# Patient Record
Sex: MALE | Race: OTHER | HISPANIC OR LATINO | Employment: UNEMPLOYED | URBAN - METROPOLITAN AREA
[De-identification: names, ages, dates, MRNs, and addresses within clinical notes are randomized per-mention and may not be internally consistent; named-entity substitution may affect disease eponyms.]

---

## 2022-02-10 ENCOUNTER — HOSPITAL ENCOUNTER (EMERGENCY)
Facility: HOSPITAL | Age: 27
Discharge: HOME | End: 2022-02-10
Attending: EMERGENCY MEDICINE | Admitting: EMERGENCY MEDICINE
Payer: COMMERCIAL

## 2022-02-10 VITALS
BODY MASS INDEX: 21.05 KG/M2 | TEMPERATURE: 98.3 F | OXYGEN SATURATION: 98 % | WEIGHT: 155.2 LBS | DIASTOLIC BLOOD PRESSURE: 77 MMHG | RESPIRATION RATE: 18 BRPM | HEART RATE: 78 BPM | SYSTOLIC BLOOD PRESSURE: 130 MMHG

## 2022-02-10 DIAGNOSIS — K64.5 THROMBOSED EXTERNAL HEMORRHOID: Primary | ICD-10-CM

## 2022-02-10 PROCEDURE — 99282 EMERGENCY DEPT VISIT SF MDM: CPT

## 2022-02-10 PROCEDURE — 99282 EMERGENCY DEPT VISIT SF MDM: CPT | Performed by: EMERGENCY MEDICINE

## 2022-02-10 PROCEDURE — 46083 INC THROMBOSED HROID XTRNL: CPT | Performed by: EMERGENCY MEDICINE

## 2022-02-10 RX ORDER — LIDOCAINE HYDROCHLORIDE AND EPINEPHRINE 10; 10 MG/ML; UG/ML
20 INJECTION, SOLUTION INFILTRATION; PERINEURAL ONCE
Status: COMPLETED | OUTPATIENT
Start: 2022-02-10 | End: 2022-02-10

## 2022-02-10 RX ORDER — LIDOCAINE 40 MG/G
CREAM TOPICAL ONCE
Status: COMPLETED | OUTPATIENT
Start: 2022-02-10 | End: 2022-02-10

## 2022-02-10 RX ADMIN — LIDOCAINE 4% 1 APPLICATION: 4 CREAM TOPICAL at 17:53

## 2022-02-10 RX ADMIN — LIDOCAINE HYDROCHLORIDE,EPINEPHRINE BITARTRATE 20 ML: 10; .01 INJECTION, SOLUTION INFILTRATION; PERINEURAL at 17:53

## 2022-02-10 NOTE — DISCHARGE INSTRUCTIONS
Hemorrhoids   WHAT YOU NEED TO KNOW:   Hemorrhoids are swollen blood vessels inside your rectum (internal hemorrhoids) or on your anus (external hemorrhoids)  Sometimes a hemorrhoid may prolapse  This means it extends out of your anus  DISCHARGE INSTRUCTIONS:   Return to the emergency department if:   You have severe pain in your rectum or around your anus  You have severe pain in your abdomen and you are vomiting  You have bleeding from your anus that soaks through your underwear  Contact your healthcare provider if:   You have frequent and painful bowel movements  Your hemorrhoid looks or feels more swollen than usual      You do not have a bowel movement for 2 days or more  You see or feel tissue coming through your anus  You have questions or concerns about your condition or care  Take a sitz bath  Fill a bathtub with 4 to 6 inches of warm water  You may also use a sitz bath pan that fits inside a toilet bowl  Sit in the sitz bath for 15 minutes  Do this 3 times a day, and after each bowel movement  The warm water can help decrease pain and swelling  Keep your anal area clean  Gently wash the area with warm water daily  Soap may irritate the area  After a bowel movement, wipe with moist towelettes or wet toilet paper  Dry toilet paper can irritate the area  Prevent hemorrhoids:   Do not strain to have a bowel movement  Do not sit on the toilet too long  These actions can increase pressure on the tissues in your rectum and anus  Drink plenty of liquids  Liquids can help prevent constipation  Start a daily fiber supplement

## 2022-04-23 ENCOUNTER — HOSPITAL ENCOUNTER (EMERGENCY)
Facility: HOSPITAL | Age: 27
End: 2022-04-24
Attending: EMERGENCY MEDICINE
Payer: COMMERCIAL

## 2022-04-23 DIAGNOSIS — T14.91XA SUICIDE ATTEMPT (HCC): Primary | ICD-10-CM

## 2022-04-23 DIAGNOSIS — E87.6 HYPOKALEMIA: ICD-10-CM

## 2022-04-23 LAB
ALBUMIN SERPL BCP-MCNC: 4.7 G/DL (ref 3.5–5)
ALP SERPL-CCNC: 87 U/L (ref 46–116)
ALT SERPL W P-5'-P-CCNC: 33 U/L (ref 12–78)
AMPHETAMINES SERPL QL SCN: NEGATIVE
ANION GAP SERPL CALCULATED.3IONS-SCNC: 11 MMOL/L (ref 4–13)
APAP SERPL-MCNC: <2 UG/ML (ref 10–20)
APTT PPP: 25 SECONDS (ref 23–37)
AST SERPL W P-5'-P-CCNC: 25 U/L (ref 5–45)
ATRIAL RATE: 108 BPM
BARBITURATES UR QL: NEGATIVE
BASOPHILS # BLD AUTO: 0.04 THOUSANDS/ΜL (ref 0–0.1)
BASOPHILS NFR BLD AUTO: 0 % (ref 0–1)
BENZODIAZ UR QL: NEGATIVE
BILIRUB SERPL-MCNC: 0.49 MG/DL (ref 0.2–1)
BUN SERPL-MCNC: 8 MG/DL (ref 5–25)
CALCIUM SERPL-MCNC: 9.4 MG/DL (ref 8.3–10.1)
CHLORIDE SERPL-SCNC: 106 MMOL/L (ref 100–108)
CK MB SERPL-MCNC: 1.2 NG/ML (ref 0–5)
CK MB SERPL-MCNC: <1 % (ref 0–2.5)
CK SERPL-CCNC: 584 U/L (ref 39–308)
CO2 SERPL-SCNC: 27 MMOL/L (ref 21–32)
COCAINE UR QL: NEGATIVE
CREAT SERPL-MCNC: 1.08 MG/DL (ref 0.6–1.3)
EOSINOPHIL # BLD AUTO: 0.03 THOUSAND/ΜL (ref 0–0.61)
EOSINOPHIL NFR BLD AUTO: 0 % (ref 0–6)
ERYTHROCYTE [DISTWIDTH] IN BLOOD BY AUTOMATED COUNT: 12.7 % (ref 11.6–15.1)
ETHANOL SERPL-MCNC: 41 MG/DL (ref 0–3)
FLUAV RNA RESP QL NAA+PROBE: NEGATIVE
FLUBV RNA RESP QL NAA+PROBE: NEGATIVE
GFR SERPL CREATININE-BSD FRML MDRD: 94 ML/MIN/1.73SQ M
GLUCOSE SERPL-MCNC: 125 MG/DL (ref 65–140)
HCT VFR BLD AUTO: 48.1 % (ref 36.5–49.3)
HGB BLD-MCNC: 16.8 G/DL (ref 12–17)
IMM GRANULOCYTES # BLD AUTO: 0.04 THOUSAND/UL (ref 0–0.2)
IMM GRANULOCYTES NFR BLD AUTO: 0 % (ref 0–2)
INR PPP: 1.04 (ref 0.84–1.19)
LYMPHOCYTES # BLD AUTO: 2.14 THOUSANDS/ΜL (ref 0.6–4.47)
LYMPHOCYTES NFR BLD AUTO: 22 % (ref 14–44)
MCH RBC QN AUTO: 31.5 PG (ref 26.8–34.3)
MCHC RBC AUTO-ENTMCNC: 34.9 G/DL (ref 31.4–37.4)
MCV RBC AUTO: 90 FL (ref 82–98)
METHADONE UR QL: NEGATIVE
MONOCYTES # BLD AUTO: 0.61 THOUSAND/ΜL (ref 0.17–1.22)
MONOCYTES NFR BLD AUTO: 6 % (ref 4–12)
NEUTROPHILS # BLD AUTO: 6.85 THOUSANDS/ΜL (ref 1.85–7.62)
NEUTS SEG NFR BLD AUTO: 72 % (ref 43–75)
NRBC BLD AUTO-RTO: 0 /100 WBCS
OPIATES UR QL SCN: NEGATIVE
OXYCODONE+OXYMORPHONE UR QL SCN: NEGATIVE
P AXIS: 87 DEGREES
PCP UR QL: NEGATIVE
PLATELET # BLD AUTO: 314 THOUSANDS/UL (ref 149–390)
PMV BLD AUTO: 9.7 FL (ref 8.9–12.7)
POTASSIUM SERPL-SCNC: 3.3 MMOL/L (ref 3.5–5.3)
PR INTERVAL: 130 MS
PROT SERPL-MCNC: 7.8 G/DL (ref 6.4–8.2)
PROTHROMBIN TIME: 13.3 SECONDS (ref 11.6–14.5)
QRS AXIS: 71 DEGREES
QRSD INTERVAL: 84 MS
QT INTERVAL: 310 MS
QTC INTERVAL: 415 MS
RBC # BLD AUTO: 5.34 MILLION/UL (ref 3.88–5.62)
RSV RNA RESP QL NAA+PROBE: NEGATIVE
SALICYLATES SERPL-MCNC: <3 MG/DL (ref 3–20)
SARS-COV-2 RNA RESP QL NAA+PROBE: NEGATIVE
SODIUM SERPL-SCNC: 144 MMOL/L (ref 136–145)
T WAVE AXIS: 58 DEGREES
THC UR QL: POSITIVE
VENTRICULAR RATE: 108 BPM
WBC # BLD AUTO: 9.71 THOUSAND/UL (ref 4.31–10.16)

## 2022-04-23 PROCEDURE — 85610 PROTHROMBIN TIME: CPT | Performed by: EMERGENCY MEDICINE

## 2022-04-23 PROCEDURE — 80053 COMPREHEN METABOLIC PANEL: CPT | Performed by: EMERGENCY MEDICINE

## 2022-04-23 PROCEDURE — 36415 COLL VENOUS BLD VENIPUNCTURE: CPT | Performed by: EMERGENCY MEDICINE

## 2022-04-23 PROCEDURE — 80179 DRUG ASSAY SALICYLATE: CPT | Performed by: EMERGENCY MEDICINE

## 2022-04-23 PROCEDURE — 0241U HB NFCT DS VIR RESP RNA 4 TRGT: CPT | Performed by: EMERGENCY MEDICINE

## 2022-04-23 PROCEDURE — 82550 ASSAY OF CK (CPK): CPT | Performed by: EMERGENCY MEDICINE

## 2022-04-23 PROCEDURE — 80307 DRUG TEST PRSMV CHEM ANLYZR: CPT | Performed by: EMERGENCY MEDICINE

## 2022-04-23 PROCEDURE — 93010 ELECTROCARDIOGRAM REPORT: CPT | Performed by: INTERNAL MEDICINE

## 2022-04-23 PROCEDURE — 99285 EMERGENCY DEPT VISIT HI MDM: CPT | Performed by: EMERGENCY MEDICINE

## 2022-04-23 PROCEDURE — 80143 DRUG ASSAY ACETAMINOPHEN: CPT | Performed by: EMERGENCY MEDICINE

## 2022-04-23 PROCEDURE — 85025 COMPLETE CBC W/AUTO DIFF WBC: CPT | Performed by: EMERGENCY MEDICINE

## 2022-04-23 PROCEDURE — 82553 CREATINE MB FRACTION: CPT | Performed by: EMERGENCY MEDICINE

## 2022-04-23 PROCEDURE — 99285 EMERGENCY DEPT VISIT HI MDM: CPT

## 2022-04-23 PROCEDURE — 85730 THROMBOPLASTIN TIME PARTIAL: CPT | Performed by: EMERGENCY MEDICINE

## 2022-04-23 PROCEDURE — 93005 ELECTROCARDIOGRAM TRACING: CPT

## 2022-04-23 PROCEDURE — 82077 ASSAY SPEC XCP UR&BREATH IA: CPT | Performed by: EMERGENCY MEDICINE

## 2022-04-23 RX ORDER — HALOPERIDOL 5 MG/ML
5 INJECTION INTRAMUSCULAR
Status: CANCELLED | OUTPATIENT
Start: 2022-04-23

## 2022-04-23 RX ORDER — LORAZEPAM 2 MG/ML
1 INJECTION INTRAMUSCULAR EVERY 4 HOURS PRN
Status: CANCELLED | OUTPATIENT
Start: 2022-04-23

## 2022-04-23 RX ORDER — BENZTROPINE MESYLATE 1 MG/ML
0.5 INJECTION INTRAMUSCULAR; INTRAVENOUS
Status: CANCELLED | OUTPATIENT
Start: 2022-04-23

## 2022-04-23 RX ORDER — HYDROXYZINE HYDROCHLORIDE 25 MG/1
50 TABLET, FILM COATED ORAL
Status: CANCELLED | OUTPATIENT
Start: 2022-04-23

## 2022-04-23 RX ORDER — LORAZEPAM 2 MG/ML
2 INJECTION INTRAMUSCULAR
Status: CANCELLED | OUTPATIENT
Start: 2022-04-23

## 2022-04-23 RX ORDER — POTASSIUM CHLORIDE 20 MEQ/1
40 TABLET, EXTENDED RELEASE ORAL ONCE
Status: COMPLETED | OUTPATIENT
Start: 2022-04-23 | End: 2022-04-23

## 2022-04-23 RX ORDER — BENZTROPINE MESYLATE 1 MG/ML
1 INJECTION INTRAMUSCULAR; INTRAVENOUS
Status: CANCELLED | OUTPATIENT
Start: 2022-04-23

## 2022-04-23 RX ORDER — LORAZEPAM 2 MG/ML
1 INJECTION INTRAMUSCULAR
Status: CANCELLED | OUTPATIENT
Start: 2022-04-23

## 2022-04-23 RX ORDER — LORAZEPAM 1 MG/1
1 TABLET ORAL EVERY 6 HOURS PRN
Status: CANCELLED | OUTPATIENT
Start: 2022-04-23

## 2022-04-23 RX ORDER — HALOPERIDOL 5 MG
2.5 TABLET ORAL
Status: CANCELLED | OUTPATIENT
Start: 2022-04-23

## 2022-04-23 RX ORDER — ACETAMINOPHEN 325 MG/1
975 TABLET ORAL EVERY 6 HOURS PRN
Status: CANCELLED | OUTPATIENT
Start: 2022-04-23

## 2022-04-23 RX ORDER — HALOPERIDOL 1 MG/1
1 TABLET ORAL EVERY 6 HOURS PRN
Status: CANCELLED | OUTPATIENT
Start: 2022-04-23

## 2022-04-23 RX ORDER — MAGNESIUM HYDROXIDE/ALUMINUM HYDROXICE/SIMETHICONE 120; 1200; 1200 MG/30ML; MG/30ML; MG/30ML
30 SUSPENSION ORAL EVERY 4 HOURS PRN
Status: CANCELLED | OUTPATIENT
Start: 2022-04-23

## 2022-04-23 RX ORDER — HALOPERIDOL 5 MG
5 TABLET ORAL
Status: CANCELLED | OUTPATIENT
Start: 2022-04-23

## 2022-04-23 RX ORDER — ACETAMINOPHEN 325 MG/1
650 TABLET ORAL EVERY 6 HOURS PRN
Status: CANCELLED | OUTPATIENT
Start: 2022-04-23

## 2022-04-23 RX ORDER — HYDROXYZINE HYDROCHLORIDE 25 MG/1
25 TABLET, FILM COATED ORAL
Status: CANCELLED | OUTPATIENT
Start: 2022-04-23

## 2022-04-23 RX ORDER — BENZTROPINE MESYLATE 1 MG/1
1 TABLET ORAL EVERY 6 HOURS PRN
Status: CANCELLED | OUTPATIENT
Start: 2022-04-23

## 2022-04-23 RX ORDER — TRAZODONE HYDROCHLORIDE 50 MG/1
50 TABLET ORAL
Status: CANCELLED | OUTPATIENT
Start: 2022-04-23

## 2022-04-23 RX ORDER — POLYETHYLENE GLYCOL 3350 17 G/17G
17 POWDER, FOR SOLUTION ORAL DAILY PRN
Status: CANCELLED | OUTPATIENT
Start: 2022-04-23

## 2022-04-23 RX ORDER — ACETAMINOPHEN 325 MG/1
650 TABLET ORAL EVERY 4 HOURS PRN
Status: CANCELLED | OUTPATIENT
Start: 2022-04-23

## 2022-04-23 RX ORDER — HALOPERIDOL 5 MG/ML
2.5 INJECTION INTRAMUSCULAR
Status: CANCELLED | OUTPATIENT
Start: 2022-04-23

## 2022-04-23 RX ADMIN — POTASSIUM CHLORIDE 40 MEQ: 1500 TABLET, EXTENDED RELEASE ORAL at 04:46

## 2022-04-23 RX ADMIN — SODIUM CHLORIDE 1000 ML: 0.9 INJECTION, SOLUTION INTRAVENOUS at 03:13

## 2022-04-23 NOTE — ED NOTES
Crisis Worker searched [address] [place] [address] and was unable to locate any insurance under Patient name and

## 2022-04-23 NOTE — ED NOTES
Pt aware of need for urine sample but unable to provide one at this time       Liza Pearson  04/23/22 6878

## 2022-04-23 NOTE — ED NOTES
offered to pt to place a dinner order, pt declined, stated his visitor is going to  food         Phill Guthrie  04/23/22 6957

## 2022-04-23 NOTE — ED NOTES
Pt resting on stretcher with 1:1 observer at bedside  Pt calm and cooperative  Pt aware of p/u time of 0830 tomorrow        Yane Elizondo RN  04/23/22 6150

## 2022-04-23 NOTE — ED NOTES
Patient signed a voluntary admission form  He reports he has no experience with the mental health system and had no knowledge of inpatient treatment or ED behavioral health rules and restrictions  He was initially hesitant to consider inpatient treatment, but did decide he needs help and maybe inpatient treatment would be helpful for him  It was explained that he would need to wait in the ED until an inpatient bed was secured for him  He is uncertain if location of the facility matters to him more than wait time  He was medically cleared at shift change, so formal written assessment needs completion  201 explained and then signed by patient and physician, however

## 2022-04-23 NOTE — ED NOTES
Haley Peterson Insurance Authorization for admission:   Phone call placed to [facility] [facility] [employer]  Phone number: [phone]     Spoke to bill     5  days approved   Good from 4/23/2022 until 4/27/2022  Level of care:inpatient   Review on 4/27/2022  Authorization # [id number]      Please call back Uri Rothman  At [phone] ext [phone]

## 2022-04-23 NOTE — ED NOTES
Pt offered breakfast at this time however pt declined  Provided with water       Debi Viera, RN  04/23/22 6632

## 2022-04-23 NOTE — ED PROVIDER NOTES
History  Chief Complaint   Patient presents with    Overdose - Intentional     pt took 9 pseudophed pills of unk dosage, drank some wine, and smoked some "weed" when asked if pt had SI, patient stated its all the time and is actively experiencing it  Pt does not deny or confirm this attempt is to end his life    Psychiatric Evaluation     This is an otherwise healthy 54-year-old male who presents with intentional overdose  Approximately 1 hour prior to arrival, the patient took approximately 9 pills of Sudafed  Patient states that he has felt depressed his entire life  Tonight, the patient just wanted to "end it all, I do not want to be in pain anymore"  States that he had 2 glasses of wine and smoked "a little bit" of marijuana  No suicide attempts in the past   Denies any homicidal thoughts  Denies any auditory/visual hallucinations  Patient will not discuss the specifics around his depression or specific reason why he attempted to commit suicide tonight  Currently, the patient has no complaints  5:44 AM  patient is medically cleared for psychiatric evaluation and treatment  None       No past medical history on file  Past Surgical History:   Procedure Laterality Date    NO PAST SURGERIES         No family history on file  I have reviewed and agree with the history as documented  E-Cigarette/Vaping     E-Cigarette/Vaping Substances     Social History     Tobacco Use    Smoking status: Never Smoker    Smokeless tobacco: Never Used   Substance Use Topics    Alcohol use: No    Drug use: No       Review of Systems   Constitutional: Negative for chills, fatigue and fever  HENT: Negative for rhinorrhea, sore throat and trouble swallowing  Eyes: Negative for photophobia and visual disturbance  Respiratory: Negative for cough, chest tightness and shortness of breath  Cardiovascular: Negative for chest pain, palpitations and leg swelling     Gastrointestinal: Negative for abdominal pain, blood in stool, diarrhea, nausea and vomiting  Endocrine: Negative for polyuria  Genitourinary: Negative for dysuria, flank pain and hematuria  Musculoskeletal: Negative for back pain and neck pain  Skin: Negative for color change and rash  Allergic/Immunologic: Negative for immunocompromised state  Neurological: Negative for dizziness, weakness, light-headedness, numbness and headaches  Psychiatric/Behavioral: Positive for suicidal ideas  Depression   All other systems reviewed and are negative  Physical Exam  Physical Exam  Constitutional:       General: He is not in acute distress  Appearance: Normal appearance  He is well-developed  HENT:      Mouth/Throat:      Pharynx: Uvula midline  Eyes:      General: Lids are normal       Conjunctiva/sclera: Conjunctivae normal       Pupils: Pupils are equal, round, and reactive to light  Neck:      Thyroid: No thyroid mass or thyromegaly  Trachea: Trachea normal    Cardiovascular:      Rate and Rhythm: Regular rhythm  Tachycardia present  Heart sounds: Normal heart sounds  No murmur heard  Pulmonary:      Effort: Pulmonary effort is normal       Breath sounds: Normal breath sounds  Abdominal:      General: Bowel sounds are normal       Palpations: Abdomen is soft  Tenderness: There is no abdominal tenderness  There is no guarding or rebound  Negative signs include Bergman's sign  Skin:     General: Skin is warm and dry  Neurological:      Mental Status: He is alert  Psychiatric:         Mood and Affect: Mood is depressed  Speech: Speech normal          Behavior: Behavior is withdrawn  Behavior is cooperative  Thought Content:  Thought content normal          Vital Signs  ED Triage Vitals [04/23/22 0221]   Temperature Pulse Respirations Blood Pressure SpO2   98 4 °F (36 9 °C) 103 18 141/81 99 %      Temp Source Heart Rate Source Patient Position - Orthostatic VS BP Location FiO2 (%)   Oral Monitor Lying Right arm --      Pain Score       --           Vitals:    04/23/22 0430 04/23/22 0445 04/23/22 0515 04/23/22 0530   BP: 134/78 138/84 142/85 143/76   Pulse: 104 104 104 (!) 108   Patient Position - Orthostatic VS: Lying Lying Lying Lying         Visual Acuity      ED Medications  Medications   sodium chloride 0 9 % bolus 1,000 mL (0 mL Intravenous Stopped 4/23/22 0338)   potassium chloride (K-DUR,KLOR-CON) CR tablet 40 mEq (40 mEq Oral Given 4/23/22 0446)       Diagnostic Studies  Results Reviewed     Procedure Component Value Units Date/Time    CKMB [[id number]]  (Normal) Collected: 04/23/22 0304    Lab Status: Final result Specimen: Blood from Arm, Right Updated: 04/23/22 0407     CK-MB Index <1 0 %      CK-MB 1 2 ng/mL     Salicylate level [[id number]]  (Abnormal) Collected: 04/23/22 0304    Lab Status: Final result Specimen: Blood from Arm, Right Updated: 15/78/01 6674     Salicylate Lvl <3 mg/dL     Acetaminophen level-If concentration is detectable, please discuss with medical  on call  [[id number]]  (Abnormal) Collected: 04/23/22 0304    Lab Status: Final result Specimen: Blood from Arm, Right Updated: 04/23/22 0406     Acetaminophen Level <2 ug/mL     Rapid drug screen, urine [[id number]]  (Abnormal) Collected: 04/23/22 0337    Lab Status: Final result Specimen: Urine, Clean Catch Updated: 04/23/22 0405     Amph/Meth UR Negative     Barbiturate Ur Negative     Benzodiazepine Urine Negative     Cocaine Urine Negative     Methadone Urine Negative     Opiate Urine Negative     PCP Ur Negative     THC Urine Positive     Oxycodone Urine Negative    Narrative:      Presumptive report  If requested, specimen will be sent to reference lab for confirmation  FOR MEDICAL PURPOSES ONLY  IF CONFIRMATION NEEDED PLEASE CONTACT THE LAB WITHIN 5 DAYS      Drug Screen Cutoff Levels:  AMPHETAMINE/METHAMPHETAMINES  1000 ng/mL  BARBITURATES     200 ng/mL  BENZODIAZEPINES     200 ng/mL  COCAINE      300 ng/mL  METHADONE      300 ng/mL  OPIATES      300 ng/mL  PHENCYCLIDINE     25 ng/mL  THC       50 ng/mL  OXYCODONE      100 ng/mL    COVID/FLU/RSV - 2 hour TAT [[id number]]  (Normal) Collected: 04/23/22 0305    Lab Status: Final result Specimen: Nares from Nose Updated: 04/23/22 0350     SARS-CoV-2 Negative     INFLUENZA A PCR Negative     INFLUENZA B PCR Negative     RSV PCR Negative    Narrative:      FOR PEDIATRIC PATIENTS - copy/paste COVID Guidelines URL to browser: [email or web address]  [employer]    SARS-CoV-2 assay is a Nucleic Acid Amplification assay intended for the  qualitative detection of nucleic acid from SARS-CoV-2 in nasopharyngeal  swabs  Results are for the presumptive identification of SARS-CoV-2 RNA  Positive results are indicative of infection with SARS-CoV-2, the virus  causing COVID-19, but do not rule out bacterial infection or co-infection  with other viruses  Laboratories within the [place] and its  territories are required to report all positive results to the appropriate  public health authorities  Negative results do not preclude SARS-CoV-2  infection and should not be used as the sole basis for treatment or other  patient management decisions  Negative results must be combined with  clinical observations, patient history, and epidemiological information  This test has not been FDA cleared or approved  This test has been authorized by FDA under an Emergency Use Authorization  (EUA)  This test is only authorized for the duration of time the  declaration that circumstances exist justifying the authorization of the  emergency use of an in vitro diagnostic tests for detection of SARS-CoV-2  virus and/or diagnosis of COVID-19 infection under section 564(b)(1) of  the Act, 21 U  S C  608KNO-3(A)(3), unless the authorization is terminated  or revoked sooner   The test has been validated but independent review by FDA  and CLIA is pending  Test performed using [employer] GeneXpert: This RT-PCR assay targets N2,  a region unique to SARS-CoV-2  A conserved region in the E-gene was chosen  for pan-Sarbecovirus detection which includes SARS-CoV-2      CK Total with Reflex CKMB [[id number]]  (Abnormal) Collected: 04/23/22 0304    Lab Status: Final result Specimen: Blood from Arm, Right Updated: 04/23/22 0348     Total  U/L     Comprehensive metabolic panel [[id number]]  (Abnormal) Collected: 04/23/22 0304    Lab Status: Final result Specimen: Blood from Arm, Right Updated: 04/23/22 0329     Sodium 144 mmol/L      Potassium 3 3 mmol/L      Chloride 106 mmol/L      CO2 27 mmol/L      ANION GAP 11 mmol/L      BUN 8 mg/dL      Creatinine 1 08 mg/dL      Glucose 125 mg/dL      Calcium 9 4 mg/dL      AST 25 U/L      ALT 33 U/L      Alkaline Phosphatase 87 U/L      Total Protein 7 8 g/dL      Albumin 4 7 g/dL      Total Bilirubin 0 49 mg/dL      eGFR 94 ml/min/1 73sq m     Narrative:      [place] guidelines for Chronic Kidney Disease (CKD):     Stage 1 with normal or high GFR (GFR > 90 mL/min/1 73 square meters)    Stage 2 Mild CKD (GFR = 60-89 mL/min/1 73 square meters)    Stage 3A Moderate CKD (GFR = 45-59 mL/min/1 73 square meters)    Stage 3B Moderate CKD (GFR = 30-44 mL/min/1 73 square meters)    Stage 4 Severe CKD (GFR = 15-29 mL/min/1 73 square meters)    Stage 5 End Stage CKD (GFR <15 mL/min/1 73 square meters)  Note: GFR calculation is accurate only with a steady state creatinine    Ethanol [[id number]]  (Abnormal) Collected: 04/23/22 0304    Lab Status: Final result Specimen: Blood from Arm, Right Updated: 04/23/22 0325     Ethanol Lvl 41 mg/dL     Protime-INR [[id number]]  (Normal) Collected: 04/23/22 0304    Lab Status: Final result Specimen: Blood from Arm, Right Updated: 04/23/22 0324     Protime 13 3 seconds      INR 1 04    APTT [[id number]]  (Normal) Collected: 04/23/22 0304    Lab Status: Final result Specimen: Blood from Arm, Right Updated: 04/23/22 0324     PTT 25 seconds     CBC and differential [[id number]] Collected: 04/23/22 0304    Lab Status: Final result Specimen: Blood from Arm, Right Updated: 04/23/22 0313     WBC 9 71 Thousand/uL      RBC 5 34 Million/uL      Hemoglobin 16 8 g/dL      Hematocrit 48 1 %      MCV 90 fL      MCH 31 5 pg      MCHC 34 9 g/dL      RDW 12 7 %      MPV 9 7 fL      Platelets 571 Thousands/uL      nRBC 0 /100 WBCs      Neutrophils Relative 72 %      Immat GRANS % 0 %      Lymphocytes Relative 22 %      Monocytes Relative 6 %      Eosinophils Relative 0 %      Basophils Relative 0 %      Neutrophils Absolute 6 85 Thousands/µL      Immature Grans Absolute 0 04 Thousand/uL      Lymphocytes Absolute 2 14 Thousands/µL      Monocytes Absolute 0 61 Thousand/µL      Eosinophils Absolute 0 03 Thousand/µL      Basophils Absolute 0 04 Thousands/µL                  No orders to display              Procedures  ECG 12 Lead Documentation Only    Date/Time: 4/23/2022 3:22 AM  Performed by: Yasmeen Linad MD  Authorized by: Yasmeen Linda MD     ECG reviewed by me, the ED Provider: yes    Patient location:  ED  Previous ECG:     Previous ECG:  Unavailable    Comparison to cardiac monitor: Yes    Interpretation:     Interpretation: non-specific    Rate:     ECG rate:  117    ECG rate assessment: tachycardic    Rhythm:     Rhythm: sinus tachycardia    Ectopy:     Ectopy: none    QRS:     QRS axis:  Normal    QRS intervals:  Normal  Conduction:     Conduction: normal    ST segments:     ST segments:  Normal  T waves:     T waves: normal               ED Course  ED Course as of 04/23/22 0544   Sat Apr 23, 2022   0349 Spoke with Candi Ruiz from poison control  Patient can exhibit hypertension, hypokalemia, tachycardia  Can also exhibit agitation and seizure which should be treated symptomatically with benzos  Can have n/v as well    Recommended 4 to 6 hour obs period post ingestion  0532 ALT: 33                               SBIRT 20yo+      Most Recent Value   SBIRT (25 yo +)    In order to provide better care to our patients, we are screening all of our patients for alcohol and drug use  Would it be okay to ask you these screening questions? Yes Filed at: 04/23/2022 2572   Initial Alcohol Screen: US AUDIT-C     1  How often do you have a drink containing alcohol? 0 Filed at: 04/23/2022 0229   2  How many drinks containing alcohol do you have on a typical day you are drinking? 1 Filed at: 04/23/2022 0229   3a  Male UNDER 65: How often do you have five or more drinks on one occasion? 0 Filed at: 04/23/2022 0229   3b  FEMALE Any Age, or MALE 65+: How often do you have 4 or more drinks on one occassion? 0 Filed at: 04/23/2022 7706   Audit-C Score 1 Filed at: 04/23/2022 9969   ARCADIO: How many times in the past year have you    Used an illegal drug or used a prescription medication for non-medical reasons? Once or Twice Filed at: 04/23/2022 0229   DAST-10: In the past 12 months       1  Have you used drugs other than those required for medical reasons? 1 Filed at: 04/23/2022 0229   2  Do you use more than one drug at a time? 0 Filed at: 04/23/2022 0229   3  Have you had medical problems as a result of your drug use (e g , memory loss, hepatitis, convulsions, bleeding, etc )? 0 Filed at: 04/23/2022 0229   4  Have you had "blackouts" or "flashbacks" as a result of drug use? YesNo 0 Filed at: 04/23/2022 0229   5  Do you ever feel bad or guilty about your drug use? 0 Filed at: 04/23/2022 0229   6  Does your spouse (or parent) ever complain about your involvement with drugs? 0 Filed at: 04/23/2022 0229   7  Have you neglected your family because of your use of drugs? 0 Filed at: 04/23/2022 0229   8  Have you engaged in illegal activities in order to obtain drugs? 0 Filed at: 04/23/2022 0229   9  Have you ever experienced withdrawal symptoms (felt sick) when you stopped taking drugs?  0 Filed at: 04/23/2022 0229   10  Are you always able to stop using drugs when you want to? 0 Filed at: 04/23/2022 0229   DAST-10 Score 1 Filed at: 04/23/2022 0229                    [facility]  Number of Diagnoses or Management Options  Diagnosis management comments: Will check labs, EKG  Rapid urine drug screen  Will speak with poison Control  Patient will require a 302 if he does not want to sign a 201 when medically cleared  Disposition  Final diagnoses:   Suicide attempt [facility])   Hypokalemia     Time reflects when diagnosis was documented in both MDM as applicable and the Disposition within this note     Time User Action Codes Description Comment    4/23/2022  4:43 AM Rober Jennings Tabelbert Sageananyaugh Suicide attempt ([address] )     4/23/2022  4:44 AM Regan Sarkar Add [E87 6] Hypokalemia       ED Disposition     ED Disposition Condition Date/Time Comment    Transfer to [facility] Apr 23, 2022  5:44 AM Edmund De La Paz should be transferred out to [facility] and has been medically cleared  Follow-up Information    None         Patient's Medications    No medications on file       No discharge procedures on file      PDMP Review     None          ED Provider  Electronically Signed by           Marialuisa Lee MD  04/23/22 0066

## 2022-04-23 NOTE — ED NOTES
Cw talked with Patient and he is reporting that someone will be home around noon so he can get his insurance card information then   He is reporting that it is a [employer] and the employer is [employer]

## 2022-04-23 NOTE — ED NOTES
LEO FELIX scheduled for 830AM to [facility]    Crisis spoke with Sherrie @ [facility] to communicate transport time

## 2022-04-23 NOTE — ED NOTES
Divya Muhammad  is 32 y o ,  male  He is currently in a long term relationship with  his girlfriend  He is employed full time for [employer]  He is a  by trade  Male who self-referred to ED due to Intentional overdose  When asked about the presenting problem, patient stated,"it is a combination of everything going on in his life "  Patient reported struggling with Depression and SI  He denied HI and Hallucinations  Patient was very vague when talking about the current stressors that had him take 9 pseudophed pills, drink  wine and smoke weed    Patient lives with his girlfriend and is able to return back to his home  Pt denies any access to guns  Pt is reporting that his appetite changes day to day  He is currently forcing himself to eat  Some days he is sick, some days he has no appetite  Pt is reporting that he has been having difficulty sleeping  He is averaging 3 to 4 hours a night  Pt denies any legal history  Pt is reporting that he has no previous or current mental health treatment  Patient was receptive to 201  201 form was completed on previous shift  Crisis worker went over the time constraints  on a 201 and Pt is reporting that 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

## 2022-04-23 NOTE — ED NOTES
RN assumed care of pt at this time  Pt is resting on stretcher with equal and even chest rise noted  No signed of distress at this time  ED Tech Eveline on pt 1:1        Yamilet Tovar RN  04/23/22 8692

## 2022-04-23 NOTE — ED NOTES
Assumed care of pt at this time, pt remains on 1:1 observation, a/w p/u time to [place]  Pt with normal work of breathing        Landon Cardenas RN  04/23/22 7618

## 2022-04-23 NOTE — EMTALA/ACUTE CARE TRANSFER
[facility] [address]  [address] [facility] [address]  Dept: [phone]      EMTALA TRANSFER CONSENT    NAME Alix Randolph                                         1995                              MRN [id number]    I have been informed of my rights regarding examination, treatment, and transfer   by Dr Austin Smith DO    Benefits: Specialized equipment and/or services available at the receiving facility (Include comment)________________________ [facility])    Risks: Potential for delay in receiving treatment,Potential deterioration of medical condition,Increased discomfort during transfer,Possible worsening of condition or death during transfer      Consent for Transfer:  I acknowledge that my medical condition has been evaluated and explained to me by the emergency department physician or other qualified medical person and/or my attending physician, who has recommended that I be transferred to the service of  Accepting Physician: Corina Steinberg at [address] Name, [address] : [place]  The above potential benefits of such transfer, the potential risks associated with such transfer, and the probable risks of not being transferred have been explained to me, and I fully understand them  The doctor has explained that, in my case, the benefits of transfer outweigh the risks  I agree to be transferred  I authorize the performance of emergency medical procedures and treatments upon me in both transit and upon arrival at the receiving facility  Additionally, I authorize the release of any and all medical records to the receiving facility and request they be transported with me, if possible  I understand that the safest mode of transportation during a medical emergency is an ambulance and that the Hospital advocates the use of this mode of transport   Risks of traveling to the receiving facility by car, including absence of medical control, life sustaining equipment, such as oxygen, and medical personnel has been explained to me and I fully understand them  (REAL CORRECT BOX BELOW)  [  ]  I consent to the stated transfer and to be transported by ambulance/helicopter  [  ]  I consent to the stated transfer, but refuse transportation by ambulance and accept full responsibility for my transportation by car  I understand the risks of non-ambulance transfers and I exonerate the Hospital and its staff from any deterioration in my condition that results from this refusal     X___________________________________________    DATE  22  TIME________  Signature of patient or legally responsible individual signing on patient behalf           RELATIONSHIP TO PATIENT_________________________          Provider Certification    NAME Mariam Nur                                         1995                              MRN [id number]    A medical screening exam was performed on the above named patient  Based on the examination:    Condition Necessitating Transfer The primary encounter diagnosis was Suicide attempt [facility])  A diagnosis of Hypokalemia was also pertinent to this visit      Patient Condition: The patient has been stabilized such that within reasonable medical probability, no material deterioration of the patient condition or the condition of the unborn child(mike) is likely to result from the transfer    Reason for Transfer: Level of Care needed not available at this facility    Transfer Requirements: [address]   · Space available and qualified personnel available for treatment as acknowledged by    · Agreed to accept transfer and to provide appropriate medical treatment as acknowledged by       [facility]  · Appropriate medical records of the examination and treatment of the patient are provided at the time of transfer   [address], [address] _______  · Transfer will be performed by qualified personnel from    and appropriate transfer equipment as required, including the use of necessary and appropriate life support measures  Provider Certification: I have examined the patient and explained the following risks and benefits of being transferred/refusing transfer to the patient/family:  General risk, such as traffic hazards, adverse weather conditions, rough terrain or turbulence, possible failure of equipment (including vehicle or aircraft), or consequences of actions of persons outside the control of the transport personnel,Unanticipated needs of medical equipment and personnel during transport,Risk of worsening condition      Based on these reasonable risks and benefits to the patient and/or the unborn child(mike), and based upon the information available at the time of the patients examination, I certify that the medical benefits reasonably to be expected from the provision of appropriate medical treatments at another medical facility outweigh the increasing risks, if any, to the individuals medical condition, and in the case of labor to the unborn child, from effecting the transfer      X____________________________________________ DATE 04/23/22        TIME_______      ORIGINAL - SEND TO MEDICAL RECORDS   COPY - SEND WITH PATIENT DURING TRANSFER

## 2022-04-23 NOTE — ED NOTES
Cw went to talk with patient he is reporting that he has medical insurance through his employer  He believes that it is a blue [employer] or a blue shield  CW asked him to call someone at home to get name on card and Pt reporting that no one is at home  Cw asked if he could text his employer no one is in the office on a Saturday  Pt is reporting that he has no friends at work to ask      Pt MA is inactive

## 2022-04-24 ENCOUNTER — HOSPITAL ENCOUNTER (INPATIENT)
Facility: HOSPITAL | Age: 27
LOS: 4 days | Discharge: HOME | DRG: 885 | End: 2022-04-28
Attending: HOSPITALIST | Admitting: PSYCHIATRY & NEUROLOGY
Payer: COMMERCIAL

## 2022-04-24 VITALS
TEMPERATURE: 98.1 F | DIASTOLIC BLOOD PRESSURE: 78 MMHG | OXYGEN SATURATION: 100 % | SYSTOLIC BLOOD PRESSURE: 124 MMHG | HEART RATE: 82 BPM | RESPIRATION RATE: 18 BRPM

## 2022-04-24 DIAGNOSIS — F32.2 MAJOR DEPRESSIVE DISORDER, SINGLE EPISODE, SEVERE WITHOUT PSYCHOSIS (HCC): Primary | ICD-10-CM

## 2022-04-24 DIAGNOSIS — E53.8 VITAMIN B12 DEFICIENCY: ICD-10-CM

## 2022-04-24 DIAGNOSIS — E87.6 HYPOKALEMIA: ICD-10-CM

## 2022-04-24 DIAGNOSIS — E55.9 VITAMIN D DEFICIENCY: ICD-10-CM

## 2022-04-24 LAB
ATRIAL RATE: 117 BPM
P AXIS: 83 DEGREES
PR INTERVAL: 130 MS
QRS AXIS: 68 DEGREES
QRSD INTERVAL: 82 MS
QT INTERVAL: 304 MS
QTC INTERVAL: 424 MS
T WAVE AXIS: 37 DEGREES
VENTRICULAR RATE: 117 BPM

## 2022-04-24 PROCEDURE — 93010 ELECTROCARDIOGRAM REPORT: CPT | Performed by: INTERNAL MEDICINE

## 2022-04-24 PROCEDURE — 99221 1ST HOSP IP/OBS SF/LOW 40: CPT | Performed by: PSYCHIATRY & NEUROLOGY

## 2022-04-24 RX ORDER — POLYETHYLENE GLYCOL 3350 17 G/17G
17 POWDER, FOR SOLUTION ORAL DAILY PRN
Status: DISCONTINUED | OUTPATIENT
Start: 2022-04-24 | End: 2022-04-28 | Stop reason: HOSPADM

## 2022-04-24 RX ORDER — TRAZODONE HYDROCHLORIDE 50 MG/1
50 TABLET ORAL
Status: DISCONTINUED | OUTPATIENT
Start: 2022-04-24 | End: 2022-04-28 | Stop reason: HOSPADM

## 2022-04-24 RX ORDER — BENZTROPINE MESYLATE 1 MG/ML
0.5 INJECTION INTRAMUSCULAR; INTRAVENOUS
Status: DISCONTINUED | OUTPATIENT
Start: 2022-04-24 | End: 2022-04-28 | Stop reason: HOSPADM

## 2022-04-24 RX ORDER — LORAZEPAM 2 MG/ML
1 INJECTION INTRAMUSCULAR EVERY 4 HOURS PRN
Status: DISCONTINUED | OUTPATIENT
Start: 2022-04-24 | End: 2022-04-28 | Stop reason: HOSPADM

## 2022-04-24 RX ORDER — HYDROXYZINE 50 MG/1
50 TABLET, FILM COATED ORAL
Status: DISCONTINUED | OUTPATIENT
Start: 2022-04-24 | End: 2022-04-28 | Stop reason: HOSPADM

## 2022-04-24 RX ORDER — LORAZEPAM 2 MG/ML
2 INJECTION INTRAMUSCULAR
Status: DISCONTINUED | OUTPATIENT
Start: 2022-04-24 | End: 2022-04-28 | Stop reason: HOSPADM

## 2022-04-24 RX ORDER — ACETAMINOPHEN 325 MG/1
650 TABLET ORAL EVERY 6 HOURS PRN
Status: DISCONTINUED | OUTPATIENT
Start: 2022-04-24 | End: 2022-04-28 | Stop reason: HOSPADM

## 2022-04-24 RX ORDER — HYDROXYZINE HYDROCHLORIDE 25 MG/1
25 TABLET, FILM COATED ORAL
Status: DISCONTINUED | OUTPATIENT
Start: 2022-04-24 | End: 2022-04-28 | Stop reason: HOSPADM

## 2022-04-24 RX ORDER — HALOPERIDOL 0.5 MG/1
1 TABLET ORAL EVERY 6 HOURS PRN
Status: DISCONTINUED | OUTPATIENT
Start: 2022-04-24 | End: 2022-04-28 | Stop reason: HOSPADM

## 2022-04-24 RX ORDER — BENZTROPINE MESYLATE 1 MG/ML
1 INJECTION INTRAMUSCULAR; INTRAVENOUS
Status: DISCONTINUED | OUTPATIENT
Start: 2022-04-24 | End: 2022-04-28 | Stop reason: HOSPADM

## 2022-04-24 RX ORDER — LORAZEPAM 2 MG/ML
1 INJECTION INTRAMUSCULAR
Status: DISCONTINUED | OUTPATIENT
Start: 2022-04-24 | End: 2022-04-28 | Stop reason: HOSPADM

## 2022-04-24 RX ORDER — MAGNESIUM HYDROXIDE/ALUMINUM HYDROXICE/SIMETHICONE 120; 1200; 1200 MG/30ML; MG/30ML; MG/30ML
30 SUSPENSION ORAL EVERY 4 HOURS PRN
Status: DISCONTINUED | OUTPATIENT
Start: 2022-04-24 | End: 2022-04-28 | Stop reason: HOSPADM

## 2022-04-24 RX ORDER — ESCITALOPRAM OXALATE 5 MG/1
5 TABLET ORAL DAILY
Status: DISCONTINUED | OUTPATIENT
Start: 2022-04-24 | End: 2022-04-27

## 2022-04-24 RX ORDER — HALOPERIDOL 5 MG/ML
2.5 INJECTION INTRAMUSCULAR
Status: DISCONTINUED | OUTPATIENT
Start: 2022-04-24 | End: 2022-04-28 | Stop reason: HOSPADM

## 2022-04-24 RX ORDER — ACETAMINOPHEN 325 MG/1
650 TABLET ORAL EVERY 4 HOURS PRN
Status: DISCONTINUED | OUTPATIENT
Start: 2022-04-24 | End: 2022-04-28 | Stop reason: HOSPADM

## 2022-04-24 RX ORDER — BENZTROPINE MESYLATE 1 MG/1
1 TABLET ORAL EVERY 6 HOURS PRN
Status: DISCONTINUED | OUTPATIENT
Start: 2022-04-24 | End: 2022-04-28 | Stop reason: HOSPADM

## 2022-04-24 RX ORDER — LORAZEPAM 1 MG/1
1 TABLET ORAL EVERY 6 HOURS PRN
Status: DISCONTINUED | OUTPATIENT
Start: 2022-04-24 | End: 2022-04-28 | Stop reason: HOSPADM

## 2022-04-24 RX ORDER — HALOPERIDOL 5 MG/ML
5 INJECTION INTRAMUSCULAR
Status: DISCONTINUED | OUTPATIENT
Start: 2022-04-24 | End: 2022-04-28 | Stop reason: HOSPADM

## 2022-04-24 RX ORDER — ACETAMINOPHEN 325 MG/1
975 TABLET ORAL EVERY 6 HOURS PRN
Status: DISCONTINUED | OUTPATIENT
Start: 2022-04-24 | End: 2022-04-28 | Stop reason: HOSPADM

## 2022-04-24 RX ORDER — HALOPERIDOL 5 MG
5 TABLET ORAL
Status: DISCONTINUED | OUTPATIENT
Start: 2022-04-24 | End: 2022-04-28 | Stop reason: HOSPADM

## 2022-04-24 RX ADMIN — ESCITALOPRAM 5 MG: 5 TABLET, FILM COATED ORAL at 12:50

## 2022-04-24 NOTE — NURSING NOTE
Pt arrived to [facility] ED  with 2 attendants via walk   Patient  under  201 Admitted for attempted suicide by overdose on seudophed pills  Minimize reason  for admission  "Realize he made a mistake after taking the pills then call 911"  Report feeling safe on unit  Agrees to let staff know if he feels unsafe or having thoughts for hurting himself  Cooperative during admission process  Compliant with skin check and body assessment   Patient orientated  to unit and room   Denies drug / alcohol use

## 2022-04-24 NOTE — H&P
Psychiatric Evaluation - Alfonso [age] y o  male MRN: [id number]  Unit/Bed#: [facility] 254-01 Encounter: [id number]    Assessment/Plan   Principal Problem:    Major depressive disorder, single episode, severe without psychosis ([address] )  Patient has bright and appropriate affect and engages in conversation with this interview in office  Does seem to be minimizing his symptoms  Does report having impulsive decision leading up to admission and feeling chronically sad but denying overt depression  Does screen positive for major depressive episode but does seem to have been fleeting and is not current as he is denying any safety concerning symptoms  Does not seem to be experiencing any hallucinogenic material and is not responding to internal stimuli  Does laugh and joke on occasion during interview and has good goal directed insight into his life in changes that need to be made  Reports the would like to engage in therapy in the future if this became available  Patient does not have psychiatric history in his past but does report multiple psychosocial stressors especially with his family and having and argument with his mother prior to overdose which he believes may have been a contributing trigger  Also reports unstable relationship with father who lives in [place] and often demands money from him  Does report poor support system and encourage patient to increase his social network and consider options such as joining clubs or other activities that he enjoys  States that he had instant regret after taking too many Sudafed and immediately self-referred himself to the emergency department  Does admit to fleeting passive death wishes at times and not wanting to experience the pain anymore    Indicates that this mostly revolves around wanting to have a normal family    Patient did try marijuana for the 1st time prior to admission approximately a few hours prior which may also have been a contributing factor as this could have acutely increased his anxiety level  Patient is unsure if he may have had products that could have been laced with alternative compounds that would not show up in UDS  Will start patient on small dose of Lexapro for mood and anxiety concerns  Will continue to monitor  Plan:   1) Start Lexapro 5 mg PO QD for mood and anxiety  2) case management to discuss psychotherapy options as outpatient  3) recommended increased peer socialization after discharge and joining some clubs or other activities he enjoys  Admission labs  Frequent safety checks and vitals per unit protocol  Collaborate with family for baseline assessment and disposition planning  Case discussed with treatment team   Treatment options and alternatives were reviewed with the patient       -----------------------------------    Chief Complaint:  I did not want to be in pain anymore      History of Present Illness     Zainab Cheung is a 32 y o  male with no prior diagnosed psychiatric history who presents voluntarily via a 12 for suicide attempt after taking 9 Sudafed pills of unknown dose in addition to wine and some marijuana  Notes emergency room indicate that patient had active and consistent suicidal ideations but patient denies this on interview with this provider today  Does state that he made impulsive decision because I just wanted the pain to end    States that this was the 1st time he ever did anything like this and denies any previous suicide attempts  Endorses large degree of remorse for his decision to do this and did admit to fleeting passive death wishes and acute psychosocial stressors that made him feel overwhelmed including argument with his mother who made demands that he should care for her more and father who has been demanding money from him that lives in [address] [place] [address] that he had instant regret immediately following the overdose and self-referred himself to the emergency room  Patient denies ever being psychiatrically hospitalized before  Denies any outpatient psychiatry or therapy services in the past   Denies any previous psychotropic trials  Patient is amendable to trial of medications and like to consider psychotherapy as outpatient  Patient does admit to some depression and sadness particularly regarding not having a normal family   Patient denies any hopelessness, helplessness, worthlessness, or guilt but does state that he feels disappointed about his actions that led to his admission  Indicates that he was overwhelmed because everything going on in my life    Reports decreased sleep of approximately 3-4 hours secondary to working night shift as a  but states that when he is not working his sleep is more normal at 6 to 7 hours  Denies any changes in interest level and states that he continues to enjoy a technology and programming  Does report decreased energy level lately  States that appetite does fluctuate up and down but denies any changes in his weight  Denies any troubles with concentration or memory  Denies any suicidal ideations or homicidal ideations but does admit to fleeting passive death wishes prior to overdose  Denies any history of dennys  Denies any auditory or visual hallucinations now or in the past   Denies any OCD symptoms or paranoia  Denies any history of abuse/trauma/PTSD symptoms  Denies any eating disorders  Patient lives in [address] [place] [address] with his girlfriend of 7 months and occasionally her 3year-old daughter whom she has joint custody of  Reports that he works for [employer] for the past 3-4 months and has been a  for 4 years  High school highest level of education  Denies legal history  Denies  history or access to firearms  Family psychiatric history is unknown and does state his father abused alcohol    Patient states he drinks alcohol socially for the past year but had not had alcohol prior to this   States that he had 2 glasses prior to admission  Denies ever having blackouts, seizure, withdrawal symptoms, need for detox/rehab, or DUIs  Reports that he used marijuana for the very 1st time prior to admission when his friend came over and is unsure if he had laced compounds in this  Denies any other illicit drug use  Denies any tobacco use or vaping  Does report drinking 1 cup of coffee in the morning  Medical Review Of Systems:  Complete review of systems is negative except as noted above  Psychiatric Review Of Systems:  Problems with sleep: yes, decreased  Appetite changes: yes, decreased  Weight changes: no  Low energy/anergy: yes  Low interest/pleasure/anhedonia: no  Somatic symptoms: no  Anxiety/panic: no  Shefali: no  Guilt/hopeless: no  Self injurious behavior/risky behavior: yes, intentional overdose    Historical Information     Psychiatric History:   Prior psychiatric diagnoses: patient denies  Inpatient hospitalizations: patient denies  Suicide attempts: This is patient's 1st attempt  Self-harm behaviors: patient denies  Violent behavior: patient denies  Outpatient treatment: patient denies  Psychiatric medication trial: None    Substance Abuse History:  Social History     Tobacco Use    Smoking status: Never Smoker    Smokeless tobacco: Never Used   Vaping Use    Vaping Use: Never used   Substance Use Topics    Alcohol use: No    Drug use: No       Patient states he drinks alcohol socially for the past year but had not had alcohol prior to this  States that he had 2 glasses prior to admission  Denies ever having blackouts, seizure, withdrawal symptoms, need for detox/rehab, or DUIs  Reports that he used marijuana for the very 1st time prior to admission when his friend came over and is unsure if he had laced compounds in this  Denies any other illicit drug use  Denies any tobacco use or vaping  Does report drinking 1 cup of coffee in the morning     I have assessed this patient for substance use within the past 12 months  Family Psychiatric History:   Family psychiatric history is unknown and does state his father abused alcohol  Social History  Marital history: Single  Children: no  Living arrangement: Lives in a home in [place], [place] with with girlfriend and sometimes girlfriend's 3year-old kid whom she has partial custody of    Functioning Relationships: good relationship with spouse or significant other, alone & isolated, poor relationship with parents and poor support system  Education: high school diploma/GED  Occupational History: works as a  for past 4 years  Other Pertinent History: None      Traumatic History:   Abuse: denies  Other Traumatic Events: denies    Past Medical History:   Past Medical History:   Diagnosis Date    Major depressive disorder         -----------------------------------  Objective    Temp:  [97 6 °F (36 4 °C)-98 1 °F (36 7 °C)] 97 6 °F (36 4 °C)  HR:  [73-98] 73  Resp:  [16-18] 18  BP: (110-135)/(56-88) 115/71    Mental Status Exam:  Appearance:  alert, good eye contact, appears stated age, casually dressed and appropriate grooming and hygiene   Behavior:  calm, cooperative and sitting comfortably   Motor: no abnormal movements and normal gait and balance   Speech:  spontaneous, normal rate, normal volume and coherent   Mood:  "I do not want to be in pain anymore"   Affect:  mood-congruent, constricted, depressed and anxious   Thought Process:  Organized, logical, goal-directed   Thought Content: no verbalized delusions or overt paranoia   Perceptual disturbances: no reported hallucinations and does not appear to be responding to internal stimuli at this time   Risk Potential: No active or passive suicidal or homicidal ideation was verbalized during interview, Low potential for aggression based on previous behavior   Cognition: oriented to self and situation, memory grossly intact, appears to be of average intelligence, normal abstract reasoning, attention span appeared shorter than expected for age and cognition not formally tested   Insight:  Fair   Judgment: Fair       Meds/Allergies   No Known Allergies  all current active meds have been reviewed    Behavioral Health Medications: all current active meds have been reviewed  Changes as in Plan section above  Laboratory results:  I have personally reviewed all pertinent laboratory/tests results    Recent Results (from the past 48 hour(s))   CBC and differential    Collection Time: 04/23/22  3:04 AM   Result Value Ref Range    WBC 9 71 4 31 - 10 16 Thousand/uL    RBC 5 34 3 88 - 5 62 Million/uL    Hemoglobin 16 8 12 0 - 17 0 g/dL    Hematocrit 48 1 36 5 - 49 3 %    MCV 90 82 - 98 fL    MCH 31 5 26 8 - 34 3 pg    MCHC 34 9 31 4 - 37 4 g/dL    RDW 12 7 11 6 - 15 1 %    MPV 9 7 8 9 - 12 7 fL    Platelets 248 762 - 824 Thousands/uL    nRBC 0 /100 WBCs    Neutrophils Relative 72 43 - 75 %    Immat GRANS % 0 0 - 2 %    Lymphocytes Relative 22 14 - 44 %    Monocytes Relative 6 4 - 12 %    Eosinophils Relative 0 0 - 6 %    Basophils Relative 0 0 - 1 %    Neutrophils Absolute 6 85 1 85 - 7 62 Thousands/µL    Immature Grans Absolute 0 04 0 00 - 0 20 Thousand/uL    Lymphocytes Absolute 2 14 0 60 - 4 47 Thousands/µL    Monocytes Absolute 0 61 0 17 - 1 22 Thousand/µL    Eosinophils Absolute 0 03 0 00 - 0 61 Thousand/µL    Basophils Absolute 0 04 0 00 - 0 10 Thousands/µL   Comprehensive metabolic panel    Collection Time: 04/23/22  3:04 AM   Result Value Ref Range    Sodium 144 136 - 145 mmol/L    Potassium 3 3 (L) 3 5 - 5 3 mmol/L    Chloride 106 100 - 108 mmol/L    CO2 27 21 - 32 mmol/L    ANION GAP 11 4 - 13 mmol/L    BUN 8 5 - 25 mg/dL    Creatinine 1 08 0 60 - 1 30 mg/dL    Glucose 125 65 - 140 mg/dL    Calcium 9 4 8 3 - 10 1 mg/dL    AST 25 5 - 45 U/L    ALT 33 12 - 78 U/L    Alkaline Phosphatase 87 46 - 116 U/L    Total Protein 7 8 6 4 - 8 2 g/dL    Albumin 4 7 3 5 - 5 0 g/dL    Total Bilirubin 0 49 0 20 - 1 00 mg/dL    eGFR 94 ml/min/1 73sq m   Protime-INR    Collection Time: 04/23/22  3:04 AM   Result Value Ref Range    Protime 13 3 11 6 - 14 5 seconds    INR 1 04 0 84 - 1 19   APTT    Collection Time: 04/23/22  3:04 AM   Result Value Ref Range    PTT 25 23 - 37 seconds   CK Total with Reflex CKMB    Collection Time: 04/23/22  3:04 AM   Result Value Ref Range    Total  (H) 39 - 308 U/L   Ethanol    Collection Time: 04/23/22  3:04 AM   Result Value Ref Range    Ethanol Lvl 41 (H) 0 - 3 mg/dL   Salicylate level    Collection Time: 04/23/22  3:04 AM   Result Value Ref Range    Salicylate Lvl <3 (L) 3 - 20 mg/dL   Acetaminophen level-If concentration is detectable, please discuss with medical  on call      Collection Time: 04/23/22  3:04 AM   Result Value Ref Range    Acetaminophen Level <2 (L) 10 - 20 ug/mL   CKMB    Collection Time: 04/23/22  3:04 AM   Result Value Ref Range    CK-MB Index <1 0 0 0 - 2 5 %    CK-MB 1 2 0 0 - 5 0 ng/mL   ECG 12 lead    Collection Time: 04/23/22  3:04 AM   Result Value Ref Range    Ventricular Rate 117 BPM    Atrial Rate 117 BPM    ID Interval 130 ms    QRSD Interval 82 ms    QT Interval 304 ms    QTC Interval 424 ms    P Axis 83 degrees    QRS Axis 68 degrees    T Wave Axis 37 degrees   COVID/FLU/RSV - 2 hour TAT    Collection Time: 04/23/22  3:05 AM    Specimen: Nose; Nares   Result Value Ref Range    SARS-CoV-2 Negative Negative    INFLUENZA A PCR Negative Negative    INFLUENZA B PCR Negative Negative    RSV PCR Negative Negative   ECG 12 lead    Collection Time: 04/23/22  3:09 AM   Result Value Ref Range    Ventricular Rate 108 BPM    Atrial Rate 108 BPM    ID Interval 130 ms    QRSD Interval 84 ms    QT Interval 310 ms    QTC Interval 415 ms    P Axis 87 degrees    QRS Axis 71 degrees    T Wave Axis 58 degrees   Rapid drug screen, urine    Collection Time: 04/23/22  3:37 AM   Result Value Ref Range    Amph/Meth UR Negative Negative Barbiturate Ur Negative Negative    Benzodiazepine Urine Negative Negative    Cocaine Urine Negative Negative    Methadone Urine Negative Negative    Opiate Urine Negative Negative    PCP Ur Negative Negative    THC Urine Positive (A) Negative    Oxycodone Urine Negative Negative        Imaging Studies: N/A  No orders to display            -----------------------------------    Risks / Benefits of Treatment:  Risks, benefits, and possible side effects of medications were explained to patient  The patient was able to verbalize understanding and agree for treatment  Counseling / Coordination of Care:  Patient's presentation on admission and proposed treatment plan were discussed with the treatment team   Diagnosis, medication changes and treatment plan were reviewed with the patient  Recent stressors were discussed with the patient  Events leading to admission were reviewed with the patient  Importance of medication and treatment compliance was reviewed with the patient  Inpatient Psychiatric Certification:     Certification: Based upon physical, mental and social evaluations, I certify that inpatient psychiatric services are medically necessary for this patient for a duration of 7 midnights for the treatment of Major depressive disorder, single episode, severe without psychosis ([facility] [address] )  Available alternative community resources do not meet the patient's mental health care needs  I further attest that an established written individualized plan of care has been implemented and is outlined in the patient's medical records        Jero Rivas DO

## 2022-04-24 NOTE — ED NOTES
RN was just informed pt has cell phone and nintendo switch was approved by previous RN  Due to good behavior, RN is willing to allow pt to keep items until  in morning       Abdullahi Knowles RN  04/23/22 3761

## 2022-04-24 NOTE — H&P
LUCY Hsu#  PWA:[id number] Andrew Atkins  OLX:[id number]    ESCAMILLA:[id number]  Adm Date: 4/24/2022 9440  9:26 AM   ATT PHY: Georgana Osler, [address]         Chief Complaint:  worsening depression with intentional overdose    History of Presenting Illness: Dakota Maldonado is a(n) 32 y o  male who is admitted to [place] [address] on voluntary 201 commitment basis  Patient originally presented to [facility] [place] [address]  ED on 04/23/2022 for intentional overdose on Sudafed due to depression  Patient denied any active suicidal homicidal ideations  Patient currently has no complaints  No Known Allergies    No current facility-administered medications on file prior to encounter  No current outpatient medications on file prior to encounter         Active Ambulatory Problems     Diagnosis Date Noted    No Active Ambulatory Problems     Resolved Ambulatory Problems     Diagnosis Date Noted    No Resolved Ambulatory Problems     No Additional Past Medical History       Past Surgical History:   Procedure Laterality Date    NO PAST SURGERIES         Social History:   Social History     Socioeconomic History    Marital status: Single     Spouse name: None    Number of children: None    Years of education: None    Highest education level: None   Occupational History    None   Tobacco Use    Smoking status: Never Smoker    Smokeless tobacco: Never Used   Vaping Use    Vaping Use: Never used   Substance and Sexual Activity    Alcohol use: No    Drug use: No    Sexual activity: None   Other Topics Concern    None   Social History Narrative    None     Social Determinants of Health     Financial Resource Strain: Not on file   Food Insecurity: Not on file   Transportation Needs: Not on file   Physical Activity: Not on file   Stress: Not on file   Social Connections: Not on file   Intimate Partner Violence: Not on file   Housing Stability: Not on file       Family History: History reviewed  No pertinent family history  Review of Systems   Neurological: Positive for headaches  All other systems reviewed and are negative  Physical Exam   Constitutional: Awake and Alert  Well-developed and well-nourished  No distress  HENT: PERR, EOMI, conjunctiva normal  Head: Normocephalic and atraumatic  Mouth/Throat: Oropharynx is clear and moist     Eyes: Conjunctivae and EOM are normal  Pupils are equal, round, and reactive to light  Right and left eye exhibits no discharge  Neck: Neck supple  No tracheal deviation present  No thyromegaly present  Cardiovascular: Normal rate, regular rhythm and normal heart sounds  Exam reveals no friction rub  No murmur heard  Pulmonary/Chest: Effort normal and breath sounds normal  No respiratory distress  No wheezes  Abdominal: Soft  Bowel sounds are normal  No distension  No tenderness  Neurological:  No focal deficits  Speech normal   Musculoskeletal:  Nontender spine, normal range of motion of extremities  Skin: Skin is warm and dry  Nilson Nelson is a(n) 32 y o  male with MDD  1  Arthralgias/headache  Patient may take Tylenol as needed  2  Insomnia  Patient may take trazodone as needed  3  Hypokalemia  Patient's CMP from 04/23/2022 showed potassium low at 3 3 mmol/L  Repeat CMP  4  Psych with history of MDD  This is being managed by the psych team     Prognosis: Fair  Discharge Plan: In progress  Advanced Directives: I have discussed in detail with the patient the advanced directives  The patient does not have a POA and does not have a living will  When discussing cardiac and pulmonary resuscitation efforts with the patient, the patient wishes to be FULL CODE  I have spent more than 50 minutes gathering data, doing physical examination, and discussing the advanced directives, which was witnessed by caring staff      The patient was discussed with Dr Nidia Small and he is in agreement with the above note

## 2022-04-24 NOTE — PLAN OF CARE
Problem: Nutrition/Hydration-ADULT  Goal: Nutrient/Hydration intake appropriate for improving, restoring or maintaining nutritional needs  Description: Monitor and assess patient's nutrition/hydration status for malnutrition  Collaborate with interdisciplinary team and initiate plan and interventions as ordered  Monitor patient's weight and dietary intake as ordered or per policy  Utilize nutrition screening tool and intervene as necessary  Determine patient's food preferences and provide high-protein, high-caloric foods as appropriate       INTERVENTIONS:  - Monitor oral intake, urinary output, labs, and treatment plans  - Assess nutrition and hydration status and recommend course of action  - Evaluate amount of meals eaten  - Assist patient with eating if necessary   - Allow adequate time for meals  - Recommend/ encourage appropriate diets, oral nutritional supplements, and vitamin/mineral supplements  - Order, calculate, and assess calorie counts as needed  - Recommend, monitor, and adjust tube feedings and TPN/PPN based on assessed needs  - Assess need for intravenous fluids  - Provide specific nutrition/hydration education as appropriate  - Include patient/family/caregiver in decisions related to nutrition  4/24/2022 1443 by Tiffany Mcdonald RN  Outcome: Progressing  4/24/2022 1443 by Tiffany Mcdonald RN  Outcome: Progressing

## 2022-04-24 NOTE — PLAN OF CARE
Problem: Nutrition/Hydration-ADULT  Goal: Nutrient/Hydration intake appropriate for improving, restoring or maintaining nutritional needs  Description: Monitor and assess patient's nutrition/hydration status for malnutrition  Collaborate with interdisciplinary team and initiate plan and interventions as ordered  Monitor patient's weight and dietary intake as ordered or per policy  Utilize nutrition screening tool and intervene as necessary  Determine patient's food preferences and provide high-protein, high-caloric foods as appropriate       INTERVENTIONS:  - Monitor oral intake, urinary output, labs, and treatment plans  - Assess nutrition and hydration status and recommend course of action  - Evaluate amount of meals eaten  - Assist patient with eating if necessary   - Allow adequate time for meals  - Recommend/ encourage appropriate diets, oral nutritional supplements, and vitamin/mineral supplements  - Order, calculate, and assess calorie counts as needed  - Recommend, monitor, and adjust tube feedings and TPN/PPN based on assessed needs  - Assess need for intravenous fluids  - Provide specific nutrition/hydration education as appropriate  - Include patient/family/caregiver in decisions related to nutrition  4/24/2022 1446 by Trish Mast RN  Outcome: Progressing  4/24/2022 1443 by Trish Mast RN  Outcome: Progressing  4/24/2022 1443 by Trish Mast RN  Outcome: Progressing  4/24/2022 1443 by Trish Mast RN  Outcome: Progressing     Problem: DEPRESSION  Goal: Will be euthymic at discharge  Description: INTERVENTIONS:  - Administer medication as ordered  - Provide emotional support via 1:1 interaction with staff  - Encourage involvement in milieu/groups/activities  - Monitor for social isolation  Outcome: Progressing     Problem: ANXIETY  Goal: Will report anxiety at manageable levels  Description: INTERVENTIONS:  - Administer medication as ordered  - Teach and encourage coping skills  - Provide emotional support  - Assess patient/family for anxiety and ability to cope  Outcome: Progressing  Goal: By discharge: Patient will verbalize 2 strategies to deal with anxiety  Description: Interventions:  - Identify any obvious source/trigger to anxiety  - Staff will assist patient in applying identified coping technique/skills  - Encourage attendance of scheduled groups and activities  Outcome: Progressing     Problem: Risk for Self Injury/Neglect  Goal: Treatment Goal: Remain safe during length of stay, learn and adopt new coping skills, and be free of self-injurious ideation, impulses and acts at the time of discharge  Outcome: Progressing  Goal: Verbalize thoughts and feelings  Description: Interventions:  - Assess and re-assess patient's lethality and potential for self-injury  - Engage patient in 1:1 interactions, daily, for a minimum of 15 minutes  - Encourage patient to express feelings, fears, frustrations, hopes  - Establish rapport/trust with patient   Outcome: Progressing  Goal: Refrain from harming self  Description: Interventions:  - Monitor patient closely, per order  - Develop a trusting relationship  - Supervise medication ingestion, monitor effects and side effects   Outcome: Progressing  Goal: Attend and participate in unit activities, including therapeutic, recreational, and educational groups  Description: Interventions:  - Provide therapeutic and educational activities daily, encourage attendance and participation, and document same in the medical record  - Obtain collateral information, encourage visitation and family involvement in care   Outcome: Progressing  Goal: Recognize maladaptive responses and adopt new coping mechanisms  Outcome: Progressing  Goal: Complete daily ADLs, including personal hygiene independently, as able  Description: Interventions:  - Observe, teach, and assist patient with ADLS  - Monitor and promote a balance of rest/activity, with adequate nutrition and elimination  Outcome: Progressing     Problem: Depression  Goal: Treatment Goal: Demonstrate behavioral control of depressive symptoms, verbalize feelings of improved mood/affect, and adopt new coping skills prior to discharge  Outcome: Progressing  Goal: Verbalize thoughts and feelings  Description: Interventions:  - Assess and re-assess patient's level of risk   - Engage patient in 1:1 interactions, daily, for a minimum of 15 minutes   - Encourage patient to express feelings, fears, frustrations, hopes   Outcome: Progressing  Goal: Refrain from harming self  Description: Interventions:  - Monitor patient closely, per order   - Supervise medication ingestion, monitor effects and side effects   Outcome: Progressing  Goal: Refrain from isolation  Description: Interventions:  - Develop a trusting relationship   - Encourage socialization   Outcome: Progressing  Goal: Refrain from self-neglect  Outcome: Progressing  Goal: Attend and participate in unit activities, including therapeutic, recreational, and educational groups  Description: Interventions:  - Provide therapeutic and educational activities daily, encourage attendance and participation, and document same in the medical record   Outcome: Progressing  Goal: Complete daily ADLs, including personal hygiene independently, as able  Description: Interventions:  - Observe, teach, and assist patient with ADLS  -  Monitor and promote a balance of rest/activity, with adequate nutrition and elimination   Outcome: Progressing

## 2022-04-24 NOTE — TREATMENT PLAN
TREATMENT PLAN REVIEW - Alfonso [age] y o  1995 male MRN: [id number]    [facility] [address] Room / Bed: [facility] 254/[facility] Encounter: [id number]          Admit Date/Time:  4/24/2022  9:26 AM    Treatment Team: Attending Provider: Cornelius Robles MD; Consulting Physician: Francoise Chapa MD; Patient Care Assistant: Luis Sweeney; Licensed Practical Nurse: Ki Tejada LPN    Diagnosis: Principal Problem:    Major depressive disorder, single episode, severe without psychosis ([facility] [address] )      Patient Strengths/Assets: ability for insight, average or above intelligence, capable of independent living, cooperative, communication skills, financial means, financially secure, good physical health, insightful, interpersonal skills, motivated, motivation for treatment/growth, negotiates basic needs, patient is on a voluntary commitment, patient is willing to work on problems, reasoning ability, resourceful, sense of humor, self reliant, special hobby/interest, stable/recent employment, supportive family/friends, well educated, work skills    Patient Barriers/Limitations: difficulty adapting, lack of social/family support, limited family ties, limited support system, substance abuse    Short Term Goals: decrease in depressive symptoms, decrease in anxiety symptoms, improvement in insight, sleep improvement, acceptance of need for psychiatric treatment, acceptance of psychiatric medications    Long Term Goals: improvement in depression, improvement in anxiety, free of suicidal thoughts, acceptance of need for psychiatric medications, acceptance of need for psychiatric treatment, acceptance of need for psychiatric follow up after discharge, acceptance of psychiatric diagnosis, adequate sleep, appropriate interaction with family, establishment of family support upon discharge    Progress Towards Goals: Patient is new to inpatient unit    Recommended Treatment: medication management, patient medication education, group therapy, milieu therapy, continued Behavioral Health psychiatric evaluation/assessment process    Treatment Frequency: daily medication monitoring, group and milieu therapy daily, monitoring through interdisciplinary rounds, monitoring through weekly patient care conferences    Expected Discharge Date:  5 days    Discharge Plan: referral for outpatient psychotherapy, referrals as indicated, return to previous living arrangement    Treatment Plan Created/Updated By: Ciarra Peraza DO

## 2022-04-25 LAB
25(OH)D3 SERPL-MCNC: 16.9 NG/ML (ref 30–100)
ALBUMIN SERPL BCP-MCNC: 4.8 G/DL (ref 3.5–5)
ALP SERPL-CCNC: 69 U/L (ref 34–104)
ALT SERPL W P-5'-P-CCNC: 16 U/L (ref 7–52)
ANION GAP SERPL CALCULATED.3IONS-SCNC: 5 MMOL/L (ref 4–13)
AST SERPL W P-5'-P-CCNC: 19 U/L (ref 13–39)
BILIRUB SERPL-MCNC: 1.16 MG/DL (ref 0.2–1)
BUN SERPL-MCNC: 10 MG/DL (ref 5–25)
CALCIUM SERPL-MCNC: 10.5 MG/DL (ref 8.4–10.2)
CHLORIDE SERPL-SCNC: 102 MMOL/L (ref 96–108)
CHOLEST SERPL-MCNC: 140 MG/DL
CO2 SERPL-SCNC: 32 MMOL/L (ref 21–32)
CREAT SERPL-MCNC: 1.18 MG/DL (ref 0.6–1.3)
FOLATE SERPL-MCNC: 10.4 NG/ML (ref 3.1–17.5)
GFR SERPL CREATININE-BSD FRML MDRD: 84 ML/MIN/1.73SQ M
GLUCOSE SERPL-MCNC: 83 MG/DL (ref 65–140)
HDLC SERPL-MCNC: 60 MG/DL
LDLC SERPL CALC-MCNC: 63 MG/DL (ref 0–100)
NONHDLC SERPL-MCNC: 80 MG/DL
POTASSIUM SERPL-SCNC: 4.3 MMOL/L (ref 3.5–5.3)
PROT SERPL-MCNC: 7.5 G/DL (ref 6.4–8.4)
SODIUM SERPL-SCNC: 139 MMOL/L (ref 135–147)
TRIGL SERPL-MCNC: 84 MG/DL
TSH SERPL DL<=0.05 MIU/L-ACNC: 0.8 UIU/ML (ref 0.45–4.5)
VIT B12 SERPL-MCNC: 436 PG/ML (ref 100–900)

## 2022-04-25 PROCEDURE — 82607 VITAMIN B-12: CPT

## 2022-04-25 PROCEDURE — 80061 LIPID PANEL: CPT

## 2022-04-25 PROCEDURE — 80053 COMPREHEN METABOLIC PANEL: CPT

## 2022-04-25 PROCEDURE — 84443 ASSAY THYROID STIM HORMONE: CPT

## 2022-04-25 PROCEDURE — 82306 VITAMIN D 25 HYDROXY: CPT

## 2022-04-25 PROCEDURE — 99232 SBSQ HOSP IP/OBS MODERATE 35: CPT | Performed by: NURSE PRACTITIONER

## 2022-04-25 PROCEDURE — 82746 ASSAY OF FOLIC ACID SERUM: CPT

## 2022-04-25 RX ORDER — ERGOCALCIFEROL 1.25 MG/1
50000 CAPSULE ORAL WEEKLY
Status: DISCONTINUED | OUTPATIENT
Start: 2022-04-25 | End: 2022-04-28 | Stop reason: HOSPADM

## 2022-04-25 RX ORDER — LOPERAMIDE HYDROCHLORIDE 2 MG/1
2 CAPSULE ORAL 4 TIMES DAILY PRN
Status: DISCONTINUED | OUTPATIENT
Start: 2022-04-25 | End: 2022-04-28 | Stop reason: HOSPADM

## 2022-04-25 RX ORDER — MELATONIN
1000 DAILY
Status: DISCONTINUED | OUTPATIENT
Start: 2022-06-27 | End: 2022-04-28 | Stop reason: HOSPADM

## 2022-04-25 RX ADMIN — ESCITALOPRAM 5 MG: 5 TABLET, FILM COATED ORAL at 08:37

## 2022-04-25 NOTE — NURSING NOTE
Patient was quiet and cooperative  Patient guarded but social  Staff support provided  Q 7 minute safety checks maintained  Patient denies SI/HI  Patient compliant with medications and groups  Staff will continue to monitor and support

## 2022-04-25 NOTE — PROGRESS NOTES
04/25/22 3566   Activity/Group Checklist   Group   ([employer])   Attendance Did not attend  (AT group offered, PT elected to remain in room)

## 2022-04-25 NOTE — NURSING NOTE
Patient withdrawn to self this evening, but seen in on the unit utilizing the phone  Patient's affect appropriate  Somewhat minimizes events leading to admission but states he was just trying to "end the pain" after an argument with mom, as he states he had tried marijuana for the first time and happened to be drinking  He states he's very had a hx of self harm but was impulsive after being under the influence and swears he will not ever use substances again and will have to set limits with his mom who sees him as a "money sign"  Patient denies SI/HI and hallucinations  Reports increased anxiety due to feeling like he is "taking a bed of someone who actually needs it"  Patient reports he is deeply remorseful of his actions and will do what he can moving forward to keep himself safe including following up with psych outpatient  No other needs identified at this time  Will remain on safety precautions and continual monitoring

## 2022-04-25 NOTE — PLAN OF CARE
Problem: Nutrition/Hydration-ADULT  Goal: Nutrient/Hydration intake appropriate for improving, restoring or maintaining nutritional needs  Description: Monitor and assess patient's nutrition/hydration status for malnutrition  Collaborate with interdisciplinary team and initiate plan and interventions as ordered  Monitor patient's weight and dietary intake as ordered or per policy  Utilize nutrition screening tool and intervene as necessary  Determine patient's food preferences and provide high-protein, high-caloric foods as appropriate       INTERVENTIONS:  - Monitor oral intake, urinary output, labs, and treatment plans  - Assess nutrition and hydration status and recommend course of action  - Evaluate amount of meals eaten  - Assist patient with eating if necessary   - Allow adequate time for meals  - Recommend/ encourage appropriate diets, oral nutritional supplements, and vitamin/mineral supplements  - Order, calculate, and assess calorie counts as needed  - Recommend, monitor, and adjust tube feedings and TPN/PPN based on assessed needs  - Assess need for intravenous fluids  - Provide specific nutrition/hydration education as appropriate  - Include patient/family/caregiver in decisions related to nutrition  Outcome: Progressing     Problem: DEPRESSION  Goal: Will be euthymic at discharge  Description: INTERVENTIONS:  - Administer medication as ordered  - Provide emotional support via 1:1 interaction with staff  - Encourage involvement in milieu/groups/activities  - Monitor for social isolation  Outcome: Progressing     Problem: ANXIETY  Goal: Will report anxiety at manageable levels  Description: INTERVENTIONS:  - Administer medication as ordered  - Teach and encourage coping skills  - Provide emotional support  - Assess patient/family for anxiety and ability to cope  Outcome: Progressing  Goal: By discharge: Patient will verbalize 2 strategies to deal with anxiety  Description: Interventions:  - Identify any obvious source/trigger to anxiety  - Staff will assist patient in applying identified coping technique/skills  - Encourage attendance of scheduled groups and activities  Outcome: Progressing     Problem: Risk for Self Injury/Neglect  Goal: Treatment Goal: Remain safe during length of stay, learn and adopt new coping skills, and be free of self-injurious ideation, impulses and acts at the time of discharge  Outcome: Progressing  Goal: Verbalize thoughts and feelings  Description: Interventions:  - Assess and re-assess patient's lethality and potential for self-injury  - Engage patient in 1:1 interactions, daily, for a minimum of 15 minutes  - Encourage patient to express feelings, fears, frustrations, hopes  - Establish rapport/trust with patient   Outcome: Progressing  Goal: Refrain from harming self  Description: Interventions:  - Monitor patient closely, per order  - Develop a trusting relationship  - Supervise medication ingestion, monitor effects and side effects   Outcome: Progressing  Goal: Attend and participate in unit activities, including therapeutic, recreational, and educational groups  Description: Interventions:  - Provide therapeutic and educational activities daily, encourage attendance and participation, and document same in the medical record  - Obtain collateral information, encourage visitation and family involvement in care   Outcome: Progressing  Goal: Recognize maladaptive responses and adopt new coping mechanisms  Outcome: Progressing  Goal: Complete daily ADLs, including personal hygiene independently, as able  Description: Interventions:  - Observe, teach, and assist patient with ADLS  - Monitor and promote a balance of rest/activity, with adequate nutrition and elimination  Outcome: Progressing     Problem: Depression  Goal: Treatment Goal: Demonstrate behavioral control of depressive symptoms, verbalize feelings of improved mood/affect, and adopt new coping skills prior to discharge  Outcome: Progressing  Goal: Verbalize thoughts and feelings  Description: Interventions:  - Assess and re-assess patient's level of risk   - Engage patient in 1:1 interactions, daily, for a minimum of 15 minutes   - Encourage patient to express feelings, fears, frustrations, hopes   Outcome: Progressing  Goal: Refrain from harming self  Description: Interventions:  - Monitor patient closely, per order   - Supervise medication ingestion, monitor effects and side effects   Outcome: Progressing  Goal: Refrain from isolation  Description: Interventions:  - Develop a trusting relationship   - Encourage socialization   Outcome: Progressing  Goal: Refrain from self-neglect  Outcome: Progressing  Goal: Attend and participate in unit activities, including therapeutic, recreational, and educational groups  Description: Interventions:  - Provide therapeutic and educational activities daily, encourage attendance and participation, and document same in the medical record   Outcome: Progressing  Goal: Complete daily ADLs, including personal hygiene independently, as able  Description: Interventions:  - Observe, teach, and assist patient with ADLS  -  Monitor and promote a balance of rest/activity, with adequate nutrition and elimination   Outcome: Progressing     Problem: Ineffective Coping  Goal: Participates in unit activities  Description: Interventions:  - Provide therapeutic environment   - Provide required programming   - Redirect inappropriate behaviors   Outcome: Progressing

## 2022-04-25 NOTE — PROGRESS NOTES
04/25/22 7745   Activity/Group Checklist   Group   ([employer]pping List for the [address] [place] [address] Ian Mixon)   Attendance Did not attend  (AT group offered, PT elected to remain in room)

## 2022-04-25 NOTE — PROGRESS NOTES
Progress Note - Reina Krause 32 y o  male MRN: [id number]    Unit/Bed#: [facility] 254-01 Encounter: [id number]        Subjective:   Patient seen and examined at bedside after reviewing the chart and discussing the case with the caring staff  Patient examined at bedside  Patient has no acute issues  On review of patient's labs from today 04/25/2022 it was found that patient's hypokalemia is resolved with potassium level 4 3 today  Patient's vitamin-D level was found to be low 16 9 and vitamin B12 level was also low 436  Physical Exam   Vitals: Blood pressure (!) 90/48, pulse 58, temperature 97 5 °F (36 4 °C), temperature source Temporal, resp  rate 18, height 5' 9" (1 753 m), weight 69 6 kg (153 lb 6 4 oz), SpO2 99 %  ,Body mass index is 22 65 kg/m²  Constitutional: He appears well-developed  HEENT: PERR, EOMI, MMM  Cardiovascular: Normal rate and regular rhythm  Pulmonary/Chest: Effort normal and breath sounds normal    Abdomen: Soft, + BS, NT    Assessment/Plan:  Reina Krause is a(n) 32 y o  male with MDD      1  Arthralgias/headache  Patient may take Tylenol as needed  2  Insomnia  Patient may take trazodone as needed  3  Hypokalemia  Seems to have resolved from labs 04/25/2022  4  New vitamin-D deficiency  I will put the patient on vitamin-D bolus doses for 10 weeks followed by vitamin D3 1000 units daily  5  New vitamin B12 deficiency  I will put the patient on vitamin B12 supplement

## 2022-04-25 NOTE — PROGRESS NOTES
Progress Note - Behavioral Health   Madan Foster 32 y o  male MRN: [id number]  Unit/Bed#: U 254-01 Encounter: [id number]    Assessment/Plan   Principal Problem:    Major depressive disorder, single episode, severe without psychosis ([address] )      Behavior over the last 24 hours:  unchanged  Sleep: normal  Appetite: normal  Medication side effects: No  ROS: diarrhea and all other systems are negative    Dewitte Bamberger was seen today for psychiatric follow-up  He was calm, cooperative, and endorses moderate anxiety due to unit stimuli  Denies depression/AVH/SI/HI  Reports frequent diarrhea and believes it is from, [place] food here  I'm used to cooking for myself    He denies any sleep disturbance and has been compliant with medications and meals  Occasionally visible in milieu, but remains withdrawn to self  Mental Status Evaluation:  Appearance:  age appropriate, casually dressed and Dyed hair   Behavior:  Withdrawn, cooperative, calm   Speech:  soft   Mood:  anxious   Affect:  blunted   Thought Process:  circumstantial   Thought Content:  No overt delusions or paranoia   Perceptual Disturbances: Denies AVH, does not appear internally preoccupied   Risk Potential: Suicidal Ideations none  Homicidal Ideations none  Potential for Aggression No   Sensorium:  person, place, time/date and situation   Memory:  recent and remote memory grossly intact   Consciousness:  alert and awake    Attention: attention span and concentration were age appropriate   Insight:  limited   Judgment: limited   Gait/Station: normal gait/station and normal balance   Motor Activity: no abnormal movements     Progress Toward Goals:  No change  Appears to be minimizing depressive symptoms  Experiencing diarrhea; Imodium PRN ordered  Will continue with current dosing of Lexapro at this time with plan to further taper for anxiety and depression once diarrhea improves  No discharge date at this time      Recommended Treatment: Continue with group therapy, milieu therapy and occupational therapy  Risks, benefits and possible side effects of Medications:   Risks, benefits, and possible side effects of medications explained to patient and patient verbalizes understanding        Medications:   all current active meds have been reviewed, continue current psychiatric medications and current meds:   Current Facility-Administered Medications   Medication Dose Route Frequency    acetaminophen (TYLENOL) tablet 650 mg  650 mg Oral Q6H PRN    acetaminophen (TYLENOL) tablet 650 mg  650 mg Oral Q4H PRN    acetaminophen (TYLENOL) tablet 975 mg  975 mg Oral Q6H PRN    aluminum-magnesium hydroxide-simethicone (MYLANTA) oral suspension 30 mL  30 mL Oral Q4H PRN    haloperidol lactate (HALDOL) injection 2 5 mg  2 5 mg Intramuscular Q4H PRN Max 4/day    And    LORazepam (ATIVAN) injection 1 mg  1 mg Intramuscular Q4H PRN Max 4/day    And    benztropine (COGENTIN) injection 0 5 mg  0 5 mg Intramuscular Q4H PRN Max 4/day    haloperidol lactate (HALDOL) injection 5 mg  5 mg Intramuscular Q4H PRN Max 4/day    And    LORazepam (ATIVAN) injection 2 mg  2 mg Intramuscular Q4H PRN Max 4/day    And    benztropine (COGENTIN) injection 1 mg  1 mg Intramuscular Q4H PRN Max 4/day    benztropine (COGENTIN) tablet 1 mg  1 mg Oral Q6H PRN    [START ON 6/27/2022] cholecalciferol (VITAMIN D3) tablet 1,000 Units  1,000 Units Oral Daily    cyanocobalamin (VITAMIN B-12) tablet 500 mcg  500 mcg Oral Daily    ergocalciferol (VITAMIN D2) capsule 50,000 Units  50,000 Units Oral Weekly    escitalopram (LEXAPRO) tablet 5 mg  5 mg Oral Daily    haloperidol (HALDOL) tablet 1 mg  1 mg Oral Q6H PRN    haloperidol (HALDOL) tablet 2 5 mg  2 5 mg Oral Q4H PRN Max 4/day    haloperidol (HALDOL) tablet 5 mg  5 mg Oral Q4H PRN Max 4/day    hydrOXYzine HCL (ATARAX) tablet 25 mg  25 mg Oral Q6H PRN Max 4/day    hydrOXYzine HCL (ATARAX) tablet 50 mg  50 mg Oral Q4H PRN Max 4/day    Or    LORazepam (ATIVAN) injection 1 mg  1 mg Intramuscular Q4H PRN    loperamide (IMODIUM) capsule 2 mg  2 mg Oral 4x Daily PRN    LORazepam (ATIVAN) tablet 1 mg  1 mg Oral Q6H PRN    Or    LORazepam (ATIVAN) injection 2 mg  2 mg Intramuscular Q6H PRN Max 3/day    polyethylene glycol (MIRALAX) packet 17 g  17 g Oral Daily PRN    traZODone (DESYREL) tablet 50 mg  50 mg Oral HS PRN     Labs: I have personally reviewed all pertinent laboratory/tests results  CMP:   Lab Results   Component Value Date    SODIUM 139 04/25/2022    K 4 3 04/25/2022     04/25/2022    CO2 32 04/25/2022    AGAP 5 04/25/2022    BUN 10 04/25/2022    CREATININE 1 18 04/25/2022    GLUC 83 04/25/2022    CALCIUM 10 5 (H) 04/25/2022    AST 19 04/25/2022    ALT 16 04/25/2022    ALKPHOS 69 04/25/2022    TP 7 5 04/25/2022    ALB 4 8 04/25/2022    TBILI 1 16 (H) 04/25/2022    EGFR 84 04/25/2022     Counseling / Coordination of Care  Total floor / unit time spent today 25 minutes  Greater than 50% of total time was spent with the patient and / or family counseling and / or coordination of care

## 2022-04-26 PROCEDURE — 99232 SBSQ HOSP IP/OBS MODERATE 35: CPT | Performed by: NURSE PRACTITIONER

## 2022-04-26 RX ADMIN — CYANOCOBALAMIN TAB 500 MCG 500 MCG: 500 TAB at 08:14

## 2022-04-26 RX ADMIN — ESCITALOPRAM 5 MG: 5 TABLET, FILM COATED ORAL at 08:14

## 2022-04-26 NOTE — PROGRESS NOTES
04/26/22 1000   Activity/Group Checklist   Group   (Community Building and Emotional Processing Art Therapy )   Attendance Attended   Attendance Duration (min) Greater than 60   Interactions Interacted appropriately   Affect/Mood Appropriate;Calm   Goals Achieved Identified feelings; Identified triggers; Identified relapse prevention strategies; Discussed coping strategies; Identified resources and support systems; Able to listen to others; Able to engage in interactions; Able to reflect/comment on own behavior;Able to manage/cope with feelings

## 2022-04-26 NOTE — PROGRESS NOTES
04/26/22 1130   Team Meeting   Meeting Type Tx Team Meeting   Team Members Present   Team Members Present Physician;;Nurse   Physician Team Member Dr Lexi Peralta MD   Nursing Team Member Indira Senior, [address] [place] [address] Management Team Member Gato Mcgee MS, [facility], [facility]   Patient/Family Present   Patient Present Yes   Patient's Family Present No   Reviewed TX plan and goals, all in agreement and signed

## 2022-04-26 NOTE — PROGRESS NOTES
04/26/22 0830   Team Meeting   Meeting Type Daily Rounds   Team Members Present   Team Members Present Physician;Nurse;   Physician Team Member Dr Rimma Villasenor MD; FUENTES Walker   Nursing Team Member Win Drummond, [facility]   Care Management Team Member Merrill Lin MS, [facility]   Patient/Family Present   Patient Present No   Patient's Family Present No   Remorseful of SA< cooperative, pleasant, slept, diarrhea, imodium given, medical to follow, medication adjustment, D/c end of week

## 2022-04-26 NOTE — PROGRESS NOTES
Progress Note - Og Le 32 y o  male MRN: [id number]    Unit/Bed#: [facility] 254-01 Encounter: [id number]        Subjective:   Patient seen and examined at bedside after reviewing the chart and discussing the case with the caring staff  Patient examined at bedside  Patient reports that he has diarrhea which was not witnessed by the staff nursing  On review of patient's labs from today 04/25/2022 it was found that patient's hypokalemia is resolved with potassium level 4 3 today  Patient's vitamin-D level was found to be low 16 9 and vitamin B12 level was also low 436  Physical Exam   Vitals: Blood pressure 95/66, pulse 74, temperature 97 5 °F (36 4 °C), temperature source Temporal, resp  rate 18, height 5' 9" (1 753 m), weight 69 6 kg (153 lb 6 4 oz), SpO2 99 %  ,Body mass index is 22 65 kg/m²  Constitutional: He appears well-developed  HEENT: PERR, EOMI, MMM  Cardiovascular: Normal rate and regular rhythm  Pulmonary/Chest: Effort normal and breath sounds normal    Abdomen: Soft, + BS, NT    Assessment/Plan:  Og Le is a(n) 32 y o  male with MDD      1  Arthralgias/headache  Patient may take Tylenol as needed  2  Insomnia  Patient may take trazodone as needed  3  Hypokalemia  Seems to have resolved from labs 04/25/2022  4  New vitamin-D deficiency  I will put the patient on vitamin-D bolus doses for 10 weeks followed by vitamin D3 1000 units daily  5  New vitamin B12 deficiency  I will put the patient on vitamin B12 supplement

## 2022-04-26 NOTE — SOCIAL WORK
CM and PT spoke with Coreen Flynn with [facility] [phone] made referral for follow up, intake is scheduled for 5/16/22 at 10am in person with Pamella Oar  Call ended mutually

## 2022-04-26 NOTE — NURSING NOTE
Patient is pleasant and engaged in conversation  Makes good eye contact  Social with peers  He reports having had some anxiety early this morning but at time of assessment, reports feeling better  States he slept well last night  No complaints of pain  Denies hallucinations and suicidal thoughts  Compliant with medications  Will continue monitoring

## 2022-04-26 NOTE — NUTRITION
04/26/22 1502   Biochemical Data,Medical Tests, and Procedures   Biochemical Data/Medical Tests/Procedures Lab values reviewed; Meds reviewed   Labs (Comment) 4/25 Ca:10 5, mildred:1 16, lipids WNL, Vit D:16 9   Meds (Comment) cogentin, Vit D3, Vit B12, lexapro, haldol, imodium, desyrel   Nutrition-Focused Physical Exam   Nutrition-Focused Physical Exam Findings RN skin assessment reviewed; No skin issues documented; Loose stool   Nutrition-Focused Physical Exam Findings reporting loose stools; states he has a hole in his tooth that causes discomfort at times  Adequacy of Intake   Nutrition Modality PO   Feeding Route   PO Independent   Current PO Intake   Current Diet Order Regular diet thin liquids   Current Meal Intake %   Estimated calorie intake compared to estimated need Nutrient needs met   PES Statement   Problem No nutrition diagnosis   Recommendations/Interventions   Malnutrition/BMI Present No  (does not meet criteria)   Summary Trigger: dental problems  Regular diet thin liquids  Meal completions 100%  Patient states his appetite is good  He reports he eats when he is hungry at home  He states she does not follow a diet plan  Reports consuming 3-4 meals per day  States his fluid intake is good  4/24/#; 2/10/#  Weight appears stable  Patient reports no weight changes  States he has been experiencing loose stools since admission however not witnessed by staff; imodium prescribed  States he has a hole in his tooth that causes discomfort at times with chewing  Reports no difficulty with current diet  Encouraged fluid intake  Continue diet as prescribed     Interventions/Recommendations Continue current diet order   Education Assessment   Education Education not indicated at this time   Nutrition Complexity Risk   Nutrition complexity level Low risk   Follow up date 05/10/22

## 2022-04-26 NOTE — NURSING NOTE
Pt sleeping for the duration of the shift and isolative to his room  No medications ordered for the pt at this time  Continuous visual safety checks performed throughout the shift  Safety precautions maintained  Will continue to monitor

## 2022-04-26 NOTE — NURSING NOTE
Pt is social and visible in the milieu  Pt  Denied depression SI/HI/AVH but endorsed anxiety mostly related to the unit  Support offered  Pt was calm and cooperative  Pt attended groups  Q 7 minute checks were maintained

## 2022-04-26 NOTE — PROGRESS NOTES
Progress Note - Behavioral Health   Elaine Mota 32 y o  male MRN: [id number]  Unit/Bed#: -01 Encounter: [id number]    Assessment/Plan   Principal Problem:    Major depressive disorder, single episode, severe without psychosis ([address] )      Behavior over the last 24 hours:  unchanged  Sleep: normal  Appetite: normal  Medication side effects: No  ROS: no complaints and all other systems are negative    Julee Kelly was seen today for psychiatric follow-up  He was calm, cooperative, and pleasant  He appears to be minimizing depressive symptoms, but reports unit stimuli is making him very anxious    Requested he be discharged home  We discussed the seriousness of events prior to admission including, impulsivity and attempted suicide via overdose on sudafed  Patient did admit that he made a, impulsive and dumb decision    Remorseful  Describes his girlfriend as a strong support  Reports, family stressors were trigger for substance use prior to admission  Denies AVH/SI/HI  Has been visible and appropriate in milieu and compliant with medications and meals  No delusional content was verbalized throughout interview, nor did Julee Kelly appear internally preoccupied      Mental Status Evaluation:  Appearance:  age appropriate, casually dressed and Dyed hair   Behavior:  Pleasant, cooperative, superficially bright   Speech:  normal pitch and normal volume   Mood:  anxious   Affect:  blunted   Thought Process:  circumstantial   Thought Content:  No overt delusions or paranoia   Perceptual Disturbances: Denies AVH, does not appear internally preoccupied   Risk Potential: Suicidal Ideations none  Homicidal Ideations none  Potential for Aggression No   Sensorium:  person, place, time/date and situation   Memory:  recent and remote memory grossly intact   Consciousness:  alert and awake    Attention: attention span and concentration were age appropriate   Insight:  Fair   Judgment: limited   Gait/Station: normal gait/station and normal balance   Motor Activity: no abnormal movements     Progress Toward Goals:  No change  Denies any psychiatric complaints, however appears to be minimizing depressive symptoms  At this time, patient shows no signs or symptoms of psychosis or dennys and denies any thoughts of harming self or others  Will continue with current psychotropic regimen; patient tolerating well  Diarrhea has since resolved  Anticipated discharge to home by end of week  Recommended Treatment: Continue with group therapy, milieu therapy and occupational therapy  Risks, benefits and possible side effects of Medications:   Risks, benefits, and possible side effects of medications explained to patient and patient verbalizes understanding        Medications:   all current active meds have been reviewed, continue current psychiatric medications and current meds:   Current Facility-Administered Medications   Medication Dose Route Frequency    acetaminophen (TYLENOL) tablet 650 mg  650 mg Oral Q6H PRN    acetaminophen (TYLENOL) tablet 650 mg  650 mg Oral Q4H PRN    acetaminophen (TYLENOL) tablet 975 mg  975 mg Oral Q6H PRN    aluminum-magnesium hydroxide-simethicone (MYLANTA) oral suspension 30 mL  30 mL Oral Q4H PRN    haloperidol lactate (HALDOL) injection 2 5 mg  2 5 mg Intramuscular Q4H PRN Max 4/day    And    LORazepam (ATIVAN) injection 1 mg  1 mg Intramuscular Q4H PRN Max 4/day    And    benztropine (COGENTIN) injection 0 5 mg  0 5 mg Intramuscular Q4H PRN Max 4/day    haloperidol lactate (HALDOL) injection 5 mg  5 mg Intramuscular Q4H PRN Max 4/day    And    LORazepam (ATIVAN) injection 2 mg  2 mg Intramuscular Q4H PRN Max 4/day    And    benztropine (COGENTIN) injection 1 mg  1 mg Intramuscular Q4H PRN Max 4/day    benztropine (COGENTIN) tablet 1 mg  1 mg Oral Q6H PRN    [START ON 6/27/2022] cholecalciferol (VITAMIN D3) tablet 1,000 Units  1,000 Units Oral Daily    cyanocobalamin (VITAMIN B-12) tablet 500 mcg  500 mcg Oral Daily    ergocalciferol (VITAMIN D2) capsule 50,000 Units  50,000 Units Oral Weekly    escitalopram (LEXAPRO) tablet 5 mg  5 mg Oral Daily    haloperidol (HALDOL) tablet 1 mg  1 mg Oral Q6H PRN    haloperidol (HALDOL) tablet 2 5 mg  2 5 mg Oral Q4H PRN Max 4/day    haloperidol (HALDOL) tablet 5 mg  5 mg Oral Q4H PRN Max 4/day    hydrOXYzine HCL (ATARAX) tablet 25 mg  25 mg Oral Q6H PRN Max 4/day    hydrOXYzine HCL (ATARAX) tablet 50 mg  50 mg Oral Q4H PRN Max 4/day    Or    LORazepam (ATIVAN) injection 1 mg  1 mg Intramuscular Q4H PRN    loperamide (IMODIUM) capsule 2 mg  2 mg Oral 4x Daily PRN    LORazepam (ATIVAN) tablet 1 mg  1 mg Oral Q6H PRN    Or    LORazepam (ATIVAN) injection 2 mg  2 mg Intramuscular Q6H PRN Max 3/day    polyethylene glycol (MIRALAX) packet 17 g  17 g Oral Daily PRN    traZODone (DESYREL) tablet 50 mg  50 mg Oral HS PRN     Labs: I have personally reviewed all pertinent laboratory/tests results  Counseling / Coordination of Care  Total floor / unit time spent today 25 minutes  Greater than 50% of total time was spent with the patient and / or family counseling and / or coordination of care   A description of the counseling / coordination of care:  Medication education, treatment plan, supportive therapy

## 2022-04-26 NOTE — DISCHARGE INSTR - OTHER ORDERS
You are being discharged to your home located at [address] [place] [address] [address], Phone: [phone]  Triggers you have identified during your hospitalization that led to your admission distressed mood, family stressors  Coping skills you have identified during your hospitalization include jeremy, music, programing  If you are unable to deal with your distressed mood alone please contact [facility] at [phone]  If that is not effective and you continue to have (ex: suicidal ideation, homicidal ideation, distressed mood, overwhelmed, in crisis) please contact (Crisis #) Jitendra  Ashely [address]: [phone], dial 911 or go to the nearest emergency center  [facility] Crisis Hotline: 729.929.6672  Peer Hotline (M-F 6-10pm): 9-415.419.8798  [facility]: [address] Drug and Alcohol Commision 081 207 11 16:  [phone]  *[employer] on [address] [place] [address] ([facility]) HELPLINE: [phone]/Website: www naeem org  *Substance Abuse and [address] [facility]) [employer], which is a confidential, free, 24-hour-a-day, 365-day-a-year, information service for individuals and family members facing mental health and/or substance use disorders  This service provides referrals to local treatment facilities, support groups, and community-based organizations  Callers can also order free publications and other information  Call 9-571.784.1412/Website: www [facility] gov  *[employer] 2-1-1: This is a toll free, confidential, 24-hour-a-day service which connects you to a community  in your area who can help you find services and resources that are available to you locally and provide critical services that can improve and save lives  Call: 80  /Website: https://k[employer]keep [employer] net/     You are being provided a refill on your medications for continuity purposes      You declined drug and alcohol treatment, should you wish to schedule please call [place] drug and alcohol commission at [phone]  You declined primary care follow up at this time, should you wish to schedule please call your insurance directly for referral      Jose A Miles RN, our [address] [place] [address] Nurse Navigator, will be calling you after your discharge, on the phone number that you provided  She will be available as an additional support, if needed  If you wish to speak with her, you may contact Bubba Baldwin at [phone]

## 2022-04-26 NOTE — SOCIAL WORK
CM met with PT  Completed psycho soc  PT reported his reason for admission was due to being impulsive and being in pain and taking medication to get rid of pain feels his choice was poorly influenced by using marijuana at the time and that he has learned his lesson and will not do that again; PT denies SI/HI/AH/VH anxiety and depression; stressors are family problems; strengths include ability to understand others, positivity, communication; limitations denies; coping skills include video games, , music; denies HX of mental health; denies past psych stays; denies psych medications; denies HX of SI/SA; denies access to firearms; denies HX of violence to self and to others; denies HX of abuse and trauma; denies family HX of mental health/suicide/homicide/susbtance abuse/dementia; denies substance abuse, reports this was his first time trying marijuana prior to admission and drinks socially; denies smoking; denies HX of addictions past or present; denies HX of arrests/probation/parol; heterosexual; has a GF of 8 months; no children; has 2 cats (brother caring for); mother lives in [place] and father in ; has 2 sisters and s brothers; able to return to his apartment; graduated high school; works as a  for the past 5 years; no  HX; no assistive devices; no Voodoo preference; drives self to appointments; 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

## 2022-04-26 NOTE — PROGRESS NOTES
04/25/22 0830   Team Meeting   Meeting Type Daily Rounds   Team Members Present   Team Members Present Physician;Nurse;   Physician Team Member Dr Kirt Oppenheim, MD; Nilsa Worthy, [facility]   Nursing Team Member Yefri Carreno, [facility] Management Team Member Maggie Guerin MS, [facility], [facility]   Patient/Family Present   Patient Present No   Patient's Family Present No   New admission, readmit score 20, drinking wine and using marijuana, SA by OD on sudafed, faimly stressors, lives with GF, remorseful, 201

## 2022-04-26 NOTE — PROGRESS NOTES
04/26/22 7136   Activity/Group Checklist   Group   ([employer])   Attendance Did not attend  (AT group offered, PT elected to remain in room)

## 2022-04-27 PROCEDURE — 99232 SBSQ HOSP IP/OBS MODERATE 35: CPT | Performed by: NURSE PRACTITIONER

## 2022-04-27 RX ORDER — ESCITALOPRAM OXALATE 10 MG/1
10 TABLET ORAL DAILY
Qty: 30 TABLET | Refills: 1 | Status: SHIPPED | OUTPATIENT
Start: 2022-04-28 | End: 2022-05-28

## 2022-04-27 RX ORDER — ESCITALOPRAM OXALATE 10 MG/1
10 TABLET ORAL DAILY
Status: DISCONTINUED | OUTPATIENT
Start: 2022-04-28 | End: 2022-04-28 | Stop reason: HOSPADM

## 2022-04-27 RX ADMIN — CYANOCOBALAMIN TAB 500 MCG 500 MCG: 500 TAB at 08:34

## 2022-04-27 RX ADMIN — ESCITALOPRAM 5 MG: 5 TABLET, FILM COATED ORAL at 08:34

## 2022-04-27 NOTE — PROGRESS NOTES
Progress Note - Behavioral Health   Shreya Rice 32 y o  male MRN: [id number]  Unit/Bed#: -01 Encounter: [id number]    Assessment/Plan   Principal Problem:    Major depressive disorder, single episode, severe without psychosis ([address] )      Behavior over the last 24 hours:  unchanged  Sleep: normal  Appetite: normal  Medication side effects: No  ROS: no complaints and all other systems are negative    Felicity Harden was seen today for psychiatric follow-up  He continues to verbalize his readiness for discharge and denies depression or SI  Remorseful about events that happened prior to admission  Endorses intermittent anxiety due to unit stimuli and feels he will be better off discharged to home and continue outpatient psychiatric follow-up  Denies any side effects from medications and agreeable to increase Lexapro to 10 mg daily for anxiety and depression management  Denies AVH/HI  Appears superficially bright, but adamantly denies any thoughts of self-harm or thoughts of harming others  Describes his girlfriend as a strong support  Has been compliant with medications and meals on unit with no behavioral outbursts  Attends and participates in groups appropriately and has been sleeping undisturbed throughout the night  Throughout encounter, no delusional content was verbalized, nor did patient appear internally preoccupied      Mental Status Evaluation:  Appearance:  age appropriate and Wearing paper scrubs, dyed hair, laying in bed   Behavior:  Superficially bright, pleasant, cooperative   Speech:  normal pitch and normal volume   Mood:  "anxious from being here "   Affect:  blunted   Thought Process:  circumstantial   Thought Content:  No overt delusions or paranoia   Perceptual Disturbances: Denies AVH, does not appear internally preoccupied   Risk Potential: Suicidal Ideations none  Homicidal Ideations none  Potential for Aggression No   Sensorium:  person, place, time/date and situation   Memory:  recent and remote memory grossly intact   Consciousness:  alert and awake    Attention: attention span and concentration were age appropriate   Insight:  fair   Judgment: Improving   Gait/Station: Resting in bed   Motor Activity: no abnormal movements     Progress Toward Goals:  No change  Endorses intermittent anxiety due to unit stimuli  Agreeable to increase Lexapro 10 mg daily for ongoing management of anxiety and depression  Verbalizes readiness for discharge  At this time, patient adamantly denies any thoughts of self-harm or thought to harm others  He also displays no signs or symptoms of active psychosis or dennys  Anticipated discharge to home tomorrow, 04/28/2022  Recommended Treatment: Continue with group therapy, milieu therapy and occupational therapy  Risks, benefits and possible side effects of Medications:   Risks, benefits, and possible side effects of medications explained to patient and patient verbalizes understanding        Medications:  Increase Lexapro 10 mg PO QD  all current active meds have been reviewed and current meds:   Current Facility-Administered Medications   Medication Dose Route Frequency    acetaminophen (TYLENOL) tablet 650 mg  650 mg Oral Q6H PRN    acetaminophen (TYLENOL) tablet 650 mg  650 mg Oral Q4H PRN    acetaminophen (TYLENOL) tablet 975 mg  975 mg Oral Q6H PRN    aluminum-magnesium hydroxide-simethicone (MYLANTA) oral suspension 30 mL  30 mL Oral Q4H PRN    haloperidol lactate (HALDOL) injection 2 5 mg  2 5 mg Intramuscular Q4H PRN Max 4/day    And    LORazepam (ATIVAN) injection 1 mg  1 mg Intramuscular Q4H PRN Max 4/day    And    benztropine (COGENTIN) injection 0 5 mg  0 5 mg Intramuscular Q4H PRN Max 4/day    haloperidol lactate (HALDOL) injection 5 mg  5 mg Intramuscular Q4H PRN Max 4/day    And    LORazepam (ATIVAN) injection 2 mg  2 mg Intramuscular Q4H PRN Max 4/day    And    benztropine (COGENTIN) injection 1 mg  1 mg Intramuscular Q4H PRN Max 4/day    benztropine (COGENTIN) tablet 1 mg  1 mg Oral Q6H PRN    [START ON 6/27/2022] cholecalciferol (VITAMIN D3) tablet 1,000 Units  1,000 Units Oral Daily    cyanocobalamin (VITAMIN B-12) tablet 500 mcg  500 mcg Oral Daily    ergocalciferol (VITAMIN D2) capsule 50,000 Units  50,000 Units Oral Weekly    [START ON 4/28/2022] escitalopram (LEXAPRO) tablet 10 mg  10 mg Oral Daily    haloperidol (HALDOL) tablet 1 mg  1 mg Oral Q6H PRN    haloperidol (HALDOL) tablet 2 5 mg  2 5 mg Oral Q4H PRN Max 4/day    haloperidol (HALDOL) tablet 5 mg  5 mg Oral Q4H PRN Max 4/day    hydrOXYzine HCL (ATARAX) tablet 25 mg  25 mg Oral Q6H PRN Max 4/day    hydrOXYzine HCL (ATARAX) tablet 50 mg  50 mg Oral Q4H PRN Max 4/day    Or    LORazepam (ATIVAN) injection 1 mg  1 mg Intramuscular Q4H PRN    loperamide (IMODIUM) capsule 2 mg  2 mg Oral 4x Daily PRN    LORazepam (ATIVAN) tablet 1 mg  1 mg Oral Q6H PRN    Or    LORazepam (ATIVAN) injection 2 mg  2 mg Intramuscular Q6H PRN Max 3/day    polyethylene glycol (MIRALAX) packet 17 g  17 g Oral Daily PRN    traZODone (DESYREL) tablet 50 mg  50 mg Oral HS PRN     Labs: I have personally reviewed all pertinent laboratory/tests results     Most Recent Labs:   Lab Results   Component Value Date    WBC 9 71 04/23/2022    RBC 5 34 04/23/2022    HGB 16 8 04/23/2022    HCT 48 1 04/23/2022     04/23/2022    RDW 12 7 04/23/2022    NEUTROABS 6 85 04/23/2022    SODIUM 139 04/25/2022    K 4 3 04/25/2022     04/25/2022    CO2 32 04/25/2022    BUN 10 04/25/2022    CREATININE 1 18 04/25/2022    GLUC 83 04/25/2022    CALCIUM 10 5 (H) 04/25/2022    AST 19 04/25/2022    ALT 16 04/25/2022    ALKPHOS 69 04/25/2022    TP 7 5 04/25/2022    ALB 4 8 04/25/2022    TBILI 1 16 (H) 04/25/2022    CHOLESTEROL 140 04/25/2022    HDL 60 04/25/2022    TRIG 84 04/25/2022    LDLCALC 63 04/25/2022    [place] 80 04/25/2022    [id number] 0 800 04/25/2022     Counseling / Coordination of Care  Total floor / unit time spent today 30 minutes  Greater than 50% of total time was spent with the patient and / or family counseling and / or coordination of care   A description of the counseling / coordination of care:  Medication education, treatment plan, supportive therapy, discharge/safety planning

## 2022-04-27 NOTE — PROGRESS NOTES
04/27/22 0830   Team Meeting   Meeting Type Daily Rounds   Team Members Present   Team Members Present Physician;Nurse;   Physician Team Member Dr Gilson Osborn MD; Eloy Ayala, [address] [place] [address]   Nursing Team Member Whitney eLzama, ALEXEY   Care Management Team Member Amaris Owusu, [place] [facility], [facility]   Patient/Family Present   Patient Present No   Patient's Family Present No   D/C Thursday calm, cooperative, remorseful, seems to minimize depression, diarrhea, medication adjustment

## 2022-04-27 NOTE — PROGRESS NOTES
04/27/22 0730   Activity/Group Checklist   Group   (Community Meeting Group and Processing)   Attendance Attended   Attendance Duration (min) 46-60   Interactions Interacted appropriately   Affect/Mood Appropriate   Goals Achieved Identified feelings; Discussed coping strategies; Identified resources and support systems; Able to listen to others; Able to engage in interactions; Able to reflect/comment on own behavior;Able to manage/cope with feelings

## 2022-04-27 NOTE — PLAN OF CARE
Problem: Ineffective Coping  Goal: Participates in unit activities  Description: Interventions:  - Provide therapeutic environment   - Provide required programming   - Redirect inappropriate behaviors   Outcome: Progressing      Group Goals:  Healthy Coping Strategies and Processing   Attendance: 2/2   Participation: Active, appropriate   Mood/Affect: Bright, calm, cooperative   Behaviors/Redirection: n/a

## 2022-04-27 NOTE — PROGRESS NOTES
Progress Note - Reina Krause 32 y o  male MRN: [id number]    Unit/Bed#: [facility] 254-01 Encounter: [id number]        Subjective:   Patient seen and examined at bedside after reviewing the chart and discussing the case with the caring staff  Patient examined at bedside  Patient has no acute complaints  Physical Exam   Vitals: Blood pressure 97/55, pulse 87, temperature 98 °F (36 7 °C), temperature source Temporal, resp  rate 18, height 5' 9" (1 753 m), weight 69 6 kg (153 lb 6 4 oz), SpO2 98 %  ,Body mass index is 22 65 kg/m²  Constitutional: Patient appears well-developed  HEENT: PERR, EOMI, MMM  Cardiovascular: Normal rate and regular rhythm  Pulmonary/Chest: Effort normal and breath sounds normal    Abdomen: Soft, + BS, NT    Assessment/Plan:  Reina Krause is a(n) 32 y o  male with MDD      1  Arthralgias/headache  Patient may take Tylenol as needed  2  Insomnia  Patient may take trazodone as needed  3  Vitamin-D deficiency  Patient started on vitamin-D bolus doses for 10 weeks followed by vitamin D3 1000 units daily  4  Vitamin B12 deficiency  Patient started on vitamin B12 supplement  The patient was discussed with Dr Martina Prescott and he is in agreement with the above note

## 2022-04-27 NOTE — NURSING NOTE
Patient visible on unit  Patient pleasant and cooperative, social with staff and peers  Denies SI,HI,AVH,Anxiety and depression  Safety checks continue Q 7 minutes

## 2022-04-27 NOTE — NURSING NOTE
Patient was pleasant and cooperative  Patient social with staff and peers  Staff support provided  Q 7 minute safety checks maintained  Patient denies SI/HI  Patient compliant with medications and groups  Staff will continue to monitor and support

## 2022-04-27 NOTE — PLAN OF CARE
Problem: DISCHARGE PLANNING - CARE MANAGEMENT  Goal: Discharge to post-acute care or home with appropriate resources  Description: INTERVENTIONS:  - Conduct assessment to determine patient/family and health care team treatment goals, and need for post-acute services based on payer coverage, community resources, and patient preferences, and barriers to discharge  - Address psychosocial, clinical, and financial barriers to discharge as identified in assessment in conjunction with the patient/family and health care team  - Arrange appropriate level of post-acute services according to patients   needs and preference and payer coverage in collaboration with the physician and health care team  - Communicate with and update the patient/family, physician, and health care team regarding progress on the discharge plan  - Arrange appropriate transportation to post-acute venues  Outcome: Completed   PT will D/C home tomorrow  Follow up with [facility]

## 2022-04-27 NOTE — PROGRESS NOTES
04/27/22 1000   Activity/Group Checklist   Group   (Healthy Coping Strategies/Check-In)   Attendance Attended   Attendance Duration (min) 46-60   Interactions Interacted appropriately   Affect/Mood Appropriate   Goals Achieved Identified feelings; Identified triggers; Identified relapse prevention strategies; Discussed coping strategies; Identified resources and support systems; Able to listen to others; Able to engage in interactions

## 2022-04-27 NOTE — PLAN OF CARE
Problem: Nutrition/Hydration-ADULT  Goal: Nutrient/Hydration intake appropriate for improving, restoring or maintaining nutritional needs  Description: Monitor and assess patient's nutrition/hydration status for malnutrition  Collaborate with interdisciplinary team and initiate plan and interventions as ordered  Monitor patient's weight and dietary intake as ordered or per policy  Utilize nutrition screening tool and intervene as necessary  Determine patient's food preferences and provide high-protein, high-caloric foods as appropriate       INTERVENTIONS:  - Monitor oral intake, urinary output, labs, and treatment plans  - Assess nutrition and hydration status and recommend course of action  - Evaluate amount of meals eaten  - Assist patient with eating if necessary   - Allow adequate time for meals  - Recommend/ encourage appropriate diets, oral nutritional supplements, and vitamin/mineral supplements  - Order, calculate, and assess calorie counts as needed  - Recommend, monitor, and adjust tube feedings and TPN/PPN based on assessed needs  - Assess need for intravenous fluids  - Provide specific nutrition/hydration education as appropriate  - Include patient/family/caregiver in decisions related to nutrition  Outcome: Progressing     Problem: DEPRESSION  Goal: Will be euthymic at discharge  Description: INTERVENTIONS:  - Administer medication as ordered  - Provide emotional support via 1:1 interaction with staff  - Encourage involvement in milieu/groups/activities  - Monitor for social isolation  Outcome: Progressing     Problem: ANXIETY  Goal: Will report anxiety at manageable levels  Description: INTERVENTIONS:  - Administer medication as ordered  - Teach and encourage coping skills  - Provide emotional support  - Assess patient/family for anxiety and ability to cope  Outcome: Progressing  Goal: By discharge: Patient will verbalize 2 strategies to deal with anxiety  Description: Interventions:  - Identify any obvious source/trigger to anxiety  - Staff will assist patient in applying identified coping technique/skills  - Encourage attendance of scheduled groups and activities  Outcome: Progressing     Problem: Risk for Self Injury/Neglect  Goal: Treatment Goal: Remain safe during length of stay, learn and adopt new coping skills, and be free of self-injurious ideation, impulses and acts at the time of discharge  Outcome: Progressing  Goal: Verbalize thoughts and feelings  Description: Interventions:  - Assess and re-assess patient's lethality and potential for self-injury  - Engage patient in 1:1 interactions, daily, for a minimum of 15 minutes  - Encourage patient to express feelings, fears, frustrations, hopes  - Establish rapport/trust with patient   Outcome: Progressing  Goal: Refrain from harming self  Description: Interventions:  - Monitor patient closely, per order  - Develop a trusting relationship  - Supervise medication ingestion, monitor effects and side effects   Outcome: Progressing  Goal: Attend and participate in unit activities, including therapeutic, recreational, and educational groups  Description: Interventions:  - Provide therapeutic and educational activities daily, encourage attendance and participation, and document same in the medical record  - Obtain collateral information, encourage visitation and family involvement in care   Outcome: Progressing  Goal: Recognize maladaptive responses and adopt new coping mechanisms  Outcome: Progressing  Goal: Complete daily ADLs, including personal hygiene independently, as able  Description: Interventions:  - Observe, teach, and assist patient with ADLS  - Monitor and promote a balance of rest/activity, with adequate nutrition and elimination  Outcome: Progressing     Problem: Ineffective Coping  Goal: Participates in unit activities  Description: Interventions:  - Provide therapeutic environment   - Provide required programming   - Redirect inappropriate behaviors   Outcome: Progressing     Problem: Ineffective Coping  Goal: Participates in unit activities  Description: Interventions:  - Provide therapeutic environment   - Provide required programming   - Redirect inappropriate behaviors   Outcome: Progressing

## 2022-04-27 NOTE — SOCIAL WORK
CM met with PT and reviewed plan for D/C tomorrow  He is going to reach out to his brother to schedule his transport for the afternoon at 2pm for tomorrow  PT feels he is ready to D/C, denies SI/HI/AH/VH anxiety and depression  Reviewed follow up care and supports, PT in agreement and signed

## 2022-04-28 VITALS
SYSTOLIC BLOOD PRESSURE: 108 MMHG | HEART RATE: 66 BPM | OXYGEN SATURATION: 99 % | WEIGHT: 153.4 LBS | BODY MASS INDEX: 22.72 KG/M2 | TEMPERATURE: 98.6 F | RESPIRATION RATE: 16 BRPM | DIASTOLIC BLOOD PRESSURE: 65 MMHG | HEIGHT: 69 IN

## 2022-04-28 PROBLEM — F32.2 MAJOR DEPRESSIVE DISORDER, SINGLE EPISODE, SEVERE WITHOUT PSYCHOSIS (HCC): Status: RESOLVED | Noted: 2022-04-24 | Resolved: 2022-04-28

## 2022-04-28 PROCEDURE — 99238 HOSP IP/OBS DSCHRG MGMT 30/<: CPT | Performed by: NURSE PRACTITIONER

## 2022-04-28 RX ORDER — MELATONIN
1000 DAILY
Qty: 30 TABLET | Refills: 0 | Status: SHIPPED | OUTPATIENT
Start: 2022-06-27

## 2022-04-28 RX ORDER — ERGOCALCIFEROL 1.25 MG/1
50000 CAPSULE ORAL WEEKLY
Qty: 10 CAPSULE | Refills: 0 | Status: SHIPPED | OUTPATIENT
Start: 2022-05-02 | End: 2022-07-05

## 2022-04-28 RX ADMIN — ESCITALOPRAM OXALATE 10 MG: 10 TABLET ORAL at 09:07

## 2022-04-28 RX ADMIN — CYANOCOBALAMIN TAB 500 MCG 500 MCG: 500 TAB at 09:07

## 2022-04-28 NOTE — NURSING NOTE
Patient escorted to the lobby with all belongings  Patient denies SI/HI  All discharge paperwork reviewed with patient  Patient appropriate in mood and affect

## 2022-04-28 NOTE — DISCHARGE SUMMARY
Discharge Summary - Moiz Mccray 32 y o  male MRN: [id number]  Unit/Bed#: -01 Encounter: [id number]     Admission Date: 4/24/2022         Discharge Date: 4/28/22    Attending Psychiatrist: Kiarra Hopper MD    Reason for Admission/HPI: Depression [F32  A]  Major depressive disorder [F32 9]      According to H&P by Dr Lady Perez 4/24/22:    History of Present Illness     Julee Kelly is a 32 y o  male with no prior diagnosed psychiatric history who presents voluntarily via a 201 for suicide attempt after taking 9 Sudafed pills of unknown dose in addition to wine and some marijuana  Notes emergency room indicate that patient had active and consistent suicidal ideations but patient denies this on interview with this provider today  Does state that he made impulsive decision because I just wanted the pain to end    States that this was the 1st time he ever did anything like this and denies any previous suicide attempts  Endorses large degree of remorse for his decision to do this and did admit to fleeting passive death wishes and acute psychosocial stressors that made him feel overwhelmed including argument with his mother who made demands that he should care for her more and father who has been demanding money from him that lives in [address] [place] [address] that he had instant regret immediately following the overdose and self-referred himself to the emergency room  Patient denies ever being psychiatrically hospitalized before  Denies any outpatient psychiatry or therapy services in the past   Denies any previous psychotropic trials  Patient is amendable to trial of medications and like to consider psychotherapy as outpatient      Patient does admit to some depression and sadness particularly regarding not having a normal family   Patient denies any hopelessness, helplessness, worthlessness, or guilt but does state that he feels disappointed about his actions that led to his admission    Indicates that he was overwhelmed because everything going on in my life    Reports decreased sleep of approximately 3-4 hours secondary to working night shift as a  but states that when he is not working his sleep is more normal at 6 to 7 hours  Denies any changes in interest level and states that he continues to enjoy a technology and programming  Does report decreased energy level lately  States that appetite does fluctuate up and down but denies any changes in his weight  Denies any troubles with concentration or memory  Denies any suicidal ideations or homicidal ideations but does admit to fleeting passive death wishes prior to overdose  Denies any history of dennys  Denies any auditory or visual hallucinations now or in the past   Denies any OCD symptoms or paranoia  Denies any history of abuse/trauma/PTSD symptoms  Denies any eating disorders      Patient lives in [place], [place] with his girlfriend of 7 months and occasionally her 3year-old daughter whom she has joint custody of  Reports that he works for [employer] for the past 3-4 months and has been a  for 4 years  High school highest level of education  Denies legal history  Denies  history or access to firearms  Family psychiatric history is unknown and does state his father abused alcohol  Patient states he drinks alcohol socially for the past year but had not had alcohol prior to this  States that he had 2 glasses prior to admission  Denies ever having blackouts, seizure, withdrawal symptoms, need for detox/rehab, or DUIs  Reports that he used marijuana for the very 1st time prior to admission when his friend came over and is unsure if he had laced compounds in this  Denies any other illicit drug use  Denies any tobacco use or vaping  Does report drinking 1 cup of coffee in the morning  Hospital Course: The patient was admitted to the inpatient psychiatric unit and started on every 7 minutes precautions  During the hospitalization the patient was attending individual therapy, group therapy, milieu therapy and occupational therapy  Psychiatric medications were titrated over the hospital stay to address depressive symptoms, anxiety symptoms and suicide attempt via overdose  The patient was started on antidepressant Lexapro  Medication doses were titrated during the hospital course  Prior to beginning of treatment medications risks and benefits and possible side effects including risk of suicidality and serotonin syndrome related to treatment with antidepressants were reviewed with the patient  The patient verbalized understanding and agreement for treatment  Patient's symptoms improved gradually over the hospital course  At the end of treatment the patient was doing well  Mood was stable at the time of discharge  The patient denied suicidal ideation, intent or plan at the time of discharge and denied homicidal ideation, intent or plan at the time of discharge  There was no overt psychosis at the time of discharge  Sleep and appetite were improved  At no point during hospitalization did patient endorse suicidal ideation or plan to harm self  Reported recent attempt was an act of impulse and remorseful immediately following  The patient was tolerating medications and was not reporting any significant side effects at the time of discharge  We felt that Miguel Alarcon achieved the maximum benefit of inpatient stay at that point, was at baseline at the end of the hospitalization and could now follow up with outpatient treatment  Miguel Alarcon also felt stable and ready for discharge at the end of the hospital stay  Prior to discharge, Miguel Alarcon verbalized an adequate safety plan to use in time of crisis  This plan includes talking with his girlfriend, returning to the nearest emergency department, or utilizing crisis hotline number that will be provided      The outpatient follow up with [facility] for [facility] intake, 5/16/22 at 10AM, was arranged by the unit  upon discharge  Patient provided 30 day supply of Lexapro with 1 refill to cover until medication management appointment is established  Mental Status at time of Discharge:     Appearance:  age appropriate, casually dressed and Dyed hair   Behavior:  Pleasant, cooperative, calm   Speech:  normal pitch and normal volume   Mood:  euthymic   Affect:  constricted   Thought Process:  goal directed and Forward thinking, linear   Thought Content:  No overt delusions or paranoia   Perceptual Disturbances: Denies AVH, does not appear internally preoccupied   Risk Potential: Suicidal Ideations none, Homicidal Ideations none and Potential for Aggression No   Sensorium:  person, place, time/date and situation   Cognition:  recent and remote memory grossly intact   Consciousness:  alert and awake    Attention: attention span and concentration were age appropriate   Insight:  fair   Judgment: limited   Gait/Station: normal gait/station and normal balance   Motor Activity: no abnormal movements     Admission Diagnosis:Depression [F32  A]  Major depressive disorder [F32 9]    Discharge Diagnosis:   Principal Problem (Resolved): Major depressive disorder, single episode, severe without psychosis ([facility] )  Active Problems:    * No active hospital problems   *    Lab results:  Admission on 04/24/2022   Component Date Value    Sodium 04/25/2022 139     Potassium 04/25/2022 4 3     Chloride 04/25/2022 102     CO2 04/25/2022 32     ANION GAP 04/25/2022 5     BUN 04/25/2022 10     Creatinine 04/25/2022 1 18     Glucose 04/25/2022 83     Calcium 04/25/2022 10 5*    AST 04/25/2022 19     ALT 04/25/2022 16     Alkaline Phosphatase 04/25/2022 69     Total Protein 04/25/2022 7 5     Albumin 04/25/2022 4 8     Total Bilirubin 04/25/2022 1 16*    eGFR 04/25/2022 84     Folate 04/25/2022 10 4     Cholesterol 04/25/2022 140     Triglycerides 04/25/2022 84     HDL, Direct 04/25/2022 60     LDL Calculated 04/25/2022 63     Non-HDL-Chol (CHOL-HDL) 04/25/2022 80     TSH 3RD GENERATON 04/25/2022 0 800     Vitamin B-12 04/25/2022 436     Vit D, 25-Hydroxy 04/25/2022 16 9*     Discharge Medications:  Current Discharge Medication List      START taking these medications    Details   cholecalciferol (VITAMIN D3) 1,000 units tablet Take 1 tablet (1,000 Units total) by mouth daily  Qty: 30 tablet, Refills: 0    Associated Diagnoses: Vitamin D deficiency      cyanocobalamin (VITAMIN B-12) 500 MCG tablet Take 1 tablet (500 mcg total) by mouth daily  Qty: 30 tablet, Refills: 0    Associated Diagnoses: Vitamin B12 deficiency      ergocalciferol (VITAMIN D2) 50,000 units Take 1 capsule (50,000 Units total) by mouth once a week for 10 doses  Qty: 10 capsule, Refills: 0    Associated Diagnoses: Vitamin D deficiency      escitalopram (LEXAPRO) 10 mg tablet Take 1 tablet (10 mg total) by mouth daily  Qty: 30 tablet, Refills: 1    Associated Diagnoses: Major depressive disorder, single episode, severe without psychosis [facility])            Current Discharge Medication List         Current Discharge Medication List         Current Discharge Medication List         Discharge instructions/Information to patient and family:   See after visit summary for information provided to patient and family  Provisions for Follow-Up Care:  See after visit summary for information related to follow-up care and any pertinent home health orders  Discharge Statement:    I spent 25 minutes discharging the patient  This time was spent on the day of discharge  I had direct contact with the patient on the day of discharge  I reviewed with Verdia Doctor importance of compliance with medications and outpatient treatment after discharge  I discussed the medication regimen and possible side effects of the medications with Verdia Doctor prior to discharge  At the time of discharge he was tolerating psychiatric medications    I discussed outpatient follow up with Ursula Corrigan  I reviewed with Ursula Corrigan crisis plan and safety plan upon discharge  Ursula Corrigan agreed to abstain from drug and alcohol use after discharge    No records found for controlled prescriptions according to FUENTES [place] 04/28/22

## 2022-04-28 NOTE — PLAN OF CARE
Problem: Ineffective Coping  Goal: Participates in unit activities  Description: Interventions:  - Provide therapeutic environment   - Provide required programming   - Redirect inappropriate behaviors   Outcome: Progressing     Group Goals:   The M[employer] We [employer]ear/ Expression of Feelings and Emotions   Attendance:  2/ 2   Participation:  Active, pleasant, cooperative, social with peers and staff, fair insight   Mood/Affect:  Appropriate to group   Behaviors/Redirection: n/a

## 2022-04-28 NOTE — SOCIAL WORK
CM met with PT for PT check in  Reviewed plan for D/C today, PT feels he is ready for D/C and confirmed that he will have his brother pick him up at 2pm  Reviewed follow up care and supports, PT in agreement with all  PT denies SI/HI/AH/VH anxiety and depression

## 2022-04-28 NOTE — NURSING NOTE
Pt denies all SI, HI, AVH at time of discharge  All belongings returned and signed for  AVS discussed and all questions answered  Pt will be escorted to ride when they arrive

## 2022-04-28 NOTE — PROGRESS NOTES
Progress Note - Celia Partida 32 y o  male MRN: [id number]    Unit/Bed#: [facility] 254-01 Encounter: [id number]        Subjective:   Patient seen and examined at bedside after reviewing the chart and discussing the case with the caring staff  Patient examined at bedside  Patient has no acute complaints  Patient is scheduled for discharge today, Thursday 4/28/2022  Patient is requesting his scripts  I have reviewed and reconciled patient's problem list and medications  Physical Exam   Vitals: Blood pressure 93/57, pulse 58, temperature 98 2 °F (36 8 °C), temperature source Temporal, resp  rate 16, height 5' 9" (1 753 m), weight 69 6 kg (153 lb 6 4 oz), SpO2 96 %  ,Body mass index is 22 65 kg/m²  Constitutional: Patient appears well-developed  HEENT: PERR, EOMI, MMM  Cardiovascular: Normal rate and regular rhythm  Pulmonary/Chest: Effort normal and breath sounds normal    Abdomen: Soft, + BS, NT    Assessment/Plan:  Celia Partida is a(n) 32 y o  male with MDD  MEDICAL CLEARANCE:  Patient is medically cleared for discharge  All scripts were sent out for the patient      1  Arthralgias/headache  Patient may take Tylenol as needed  2  Insomnia  Patient may take trazodone as needed  3  Vitamin-D deficiency  Patient started on vitamin-D bolus doses for 10 weeks followed by vitamin D3 1000 units daily  4  Vitamin B12 deficiency  Patient started on vitamin B12 supplement  The patient was discussed with Dr Gwendolyn Call and he is in agreement with the above note

## 2022-04-28 NOTE — DISCHARGE INSTRUCTIONS
Depression Management for Older Adults   WHAT YOU NEED TO KNOW:   Depression is a condition that causes feelings of sadness or hopelessness that do not go away  You may lose interest in things you used to enjoy  Depression is not a normal part of aging  Treatment is very important and can help improve your daily life  DISCHARGE INSTRUCTIONS:   Call your local emergency number (911 in the [address]) if:   · You think about harming yourself or someone else  · You have done something on purpose to hurt yourself  Call your therapist or doctor if:   · Your symptoms do not improve  · You have new symptoms  · You have questions or concerns about your condition or care  The following resources are available at any time to help you, if needed:   · [address] [place] [address]: 7-178.231.5049 (1-270-105-QKEJ)     · Suicide Hotline: 0-982.507.9980 (4-343-HIKWGRZ)     · For a list of international numbers: https://save org/find-help/international-resources/    Medicines:  Tell your healthcare provider about other medicines you take  This will help him or her recommend the right kind for you  Tell your provider if you need help creating a medicine schedule or reminders to take your medicines  · Antidepressants  may be given to improve or balance your mood  You may need to take this medicine for several weeks before you begin to feel better  · Take your medicine as directed  Contact your healthcare provider if you think your medicine is not helping or if you have side effects  Tell him of her if you are allergic to any medicine  Keep a list of the medicines, vitamins, and herbs you take  Include the amounts, and when and why you take them  Bring the list or the pill bottles to follow-up visits  Carry your medicine list with you in case of an emergency  Therapy  is often used together with medicine to relieve depression   Therapy is a way for you to talk about your feelings and anything that may be causing depression  Therapy can be done alone or in a group  It may also be done with family members or a significant other  What you can do to manage depression:   · Connect with others  Connection can help after loss, especially on holidays, birthdays, or anniversaries  You may want to  a young student or volunteer at a local organization  You may also be able to find groups that participate in activities or interests you enjoy  Your Jehovah's witness or spiritual organization may offer activities you can participate in  It may also help to talk with your Jehovah's witness or spiritual leader about how you are feeling  · Exercise as directed  Exercise can lift your mood, increase energy, and make it easier to sleep  If you have balance problems or other physical limits, your healthcare provider can help you create a safe exercise plan  You may enjoy a group exercise class, or a friend may be able to exercise with you  · Ask about equipment to increase your comfort and mobility  Examples are hearing aids, glasses, large print books, and walkers  These can help you enjoy activities and feel more independent  · Continue taking medicine and going to therapy  Medicine and therapy can help  It may take some time before you start to feel better  Talk to your healthcare providers if you notice any change in your depression  · Seek help for drug or alcohol abuse, if needed  Drugs and alcohol can make depression worse  Your healthcare providers can give you information if you need help quitting  Follow up with your healthcare provider as directed: Your healthcare provider will monitor your progress at follow-up visits  He or she will also monitor your medicine if you take antidepressants  Your healthcare provider will ask if the medicine is helping  Tell him or her about any side effects or problems you may have with your medicine  The type or amount of medicine may need to be changed   Write down your questions so you remember to ask them during your visits  © Copyright [employer] 2022 Information is for End User's use only and may not be sold, redistributed or otherwise used for commercial purposes  All illustrations and images included in CareNotes® are the copyrighted property of A D A M , Inc  or Carlitos Dahl  The above information is an  only  It is not intended as medical advice for individual conditions or treatments  Talk to your doctor, nurse or pharmacist before following any medical regimen to see if it is safe and effective for you

## 2022-04-28 NOTE — PROGRESS NOTES
Patient D/C Belongings:    In Room - shirt x1, underwear x1, socks x1    Storage - hoodie x1, sweatpants x1    Contraband - nintendo switch x1, cell phone x1,  x2, headphones x1, keys    Safe bag returned

## 2022-04-28 NOTE — PROGRESS NOTES
04/28/22 0742   Activity/Group Checklist   Group   (Community Meeting Group and Processing)   Attendance Attended   Attendance Duration (min) 46-60   Interactions Interacted appropriately   Affect/Mood Appropriate   Goals Achieved Identified feelings; Discussed coping strategies; Increased hopefulness; Able to listen to others; Able to engage in interactions

## 2022-04-28 NOTE — PROGRESS NOTES
04/28/22 1000   Activity/Group Checklist   Group   ([employer] Art Therapy Processing)   Attendance Attended   Attendance Duration (min) Greater than 60   Interactions Interacted appropriately   Affect/Mood Appropriate;Bright   Goals Achieved Identified feelings; Identified triggers; Discussed coping strategies; Increased hopefulness; Identified resources and support systems; Able to listen to others; Able to engage in interactions;Verbalized increased hopefulness; Able to recieve feedback; Able to give feedback to another

## 2022-04-28 NOTE — BH TRANSITION RECORD
Contact Information: If you have any questions, concerns, pended studies, tests and/or procedures, or emergencies regarding your inpatient behavioral health visit  Please contact Jitendra Castro" [facility] behavioral health unit [phone] and ask to speak to a , nurse or physician  A contact is available 24 hours/ 7 days a week at this number  Summary of Procedures Performed During your Stay:  Below is a list of major procedures performed during your hospital stay and a summary of results:  - No major procedures performed  Pending Studies (From admission, onward)    None        If studies are pending at discharge, follow up with your PCP and/or referring provider

## 2024-06-17 ENCOUNTER — APPOINTMENT (EMERGENCY)
Dept: RADIOLOGY | Facility: HOSPITAL | Age: 29
End: 2024-06-17
Payer: COMMERCIAL

## 2024-06-17 ENCOUNTER — HOSPITAL ENCOUNTER (EMERGENCY)
Facility: HOSPITAL | Age: 29
Discharge: HOME | End: 2024-06-17
Attending: EMERGENCY MEDICINE
Payer: COMMERCIAL

## 2024-06-17 VITALS
DIASTOLIC BLOOD PRESSURE: 78 MMHG | TEMPERATURE: 98.2 F | SYSTOLIC BLOOD PRESSURE: 140 MMHG | OXYGEN SATURATION: 97 % | RESPIRATION RATE: 16 BRPM | HEART RATE: 92 BPM

## 2024-06-17 DIAGNOSIS — S16.1XXA STRAIN OF NECK MUSCLE, INITIAL ENCOUNTER: Primary | ICD-10-CM

## 2024-06-17 PROCEDURE — 96372 THER/PROPH/DIAG INJ SC/IM: CPT

## 2024-06-17 PROCEDURE — 72040 X-RAY EXAM NECK SPINE 2-3 VW: CPT

## 2024-06-17 PROCEDURE — 99283 EMERGENCY DEPT VISIT LOW MDM: CPT

## 2024-06-17 PROCEDURE — 99284 EMERGENCY DEPT VISIT MOD MDM: CPT

## 2024-06-17 RX ORDER — ACETAMINOPHEN 325 MG/1
975 TABLET ORAL ONCE
Status: COMPLETED | OUTPATIENT
Start: 2024-06-17 | End: 2024-06-17

## 2024-06-17 RX ORDER — CYCLOBENZAPRINE HCL 10 MG
10 TABLET ORAL ONCE
Status: COMPLETED | OUTPATIENT
Start: 2024-06-17 | End: 2024-06-17

## 2024-06-17 RX ORDER — CYCLOBENZAPRINE HCL 10 MG
10 TABLET ORAL 2 TIMES DAILY PRN
Qty: 20 TABLET | Refills: 0 | Status: SHIPPED | OUTPATIENT
Start: 2024-06-17

## 2024-06-17 RX ORDER — LIDOCAINE 50 MG/G
1 PATCH TOPICAL ONCE
Status: DISCONTINUED | OUTPATIENT
Start: 2024-06-17 | End: 2024-06-17 | Stop reason: HOSPADM

## 2024-06-17 RX ORDER — KETOROLAC TROMETHAMINE 30 MG/ML
15 INJECTION, SOLUTION INTRAMUSCULAR; INTRAVENOUS ONCE
Status: COMPLETED | OUTPATIENT
Start: 2024-06-17 | End: 2024-06-17

## 2024-06-17 RX ADMIN — CYCLOBENZAPRINE 10 MG: 10 TABLET, FILM COATED ORAL at 09:24

## 2024-06-17 RX ADMIN — ACETAMINOPHEN 975 MG: 325 TABLET, FILM COATED ORAL at 08:22

## 2024-06-17 RX ADMIN — KETOROLAC TROMETHAMINE 15 MG: 30 INJECTION, SOLUTION INTRAMUSCULAR; INTRAVENOUS at 08:22

## 2024-06-17 RX ADMIN — LIDOCAINE 5% 1 PATCH: 700 PATCH TOPICAL at 08:22

## 2024-06-17 NOTE — DISCHARGE INSTRUCTIONS
Take 650mg of acetaminophen (Tylenol) with 400 mg of ibuprofen (Advil) every 6-8 hours as needed for pain.  Lidocaine patches are available over-the-counter can be found in the back pain aisle of most pharmacies.  Look for 4% lidocaine in the list of the active ingredients.  These patches can be placed for 12 hours.  After 12 hours, discard the patch.  The next patch can be placed 12 hours later.  You may take your Flexeril as needed.  Be advised it is a muscle relaxer may cause drowsiness, do not drive right.  Heavy machinery after use.  Please follow-up with comprehensive spine program for further evaluation and management.  Return to the ED if you develop any new or worsening symptoms.
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
## 2024-06-18 ENCOUNTER — TELEPHONE (OUTPATIENT)
Dept: PHYSICAL THERAPY | Facility: OTHER | Age: 29
End: 2024-06-18

## 2024-06-18 NOTE — TELEPHONE ENCOUNTER
Call placed to the patient per [facility] referral.    Spoke with patient, explained program,protocol and reason for the call.    Patient declined services.    [facility] phone number and hours of business provided in case he would need our services in the future.    [facility] referral closed per protocol.

## 2024-10-03 ENCOUNTER — OCCMED (OUTPATIENT)
Dept: URGENT CARE | Facility: CLINIC | Age: 29
End: 2024-10-03

## 2024-10-03 DIAGNOSIS — Z02.83 ENCOUNTER FOR DRUG SCREENING: Primary | ICD-10-CM
